# Patient Record
Sex: FEMALE | Race: WHITE | NOT HISPANIC OR LATINO | Employment: UNEMPLOYED | URBAN - METROPOLITAN AREA
[De-identification: names, ages, dates, MRNs, and addresses within clinical notes are randomized per-mention and may not be internally consistent; named-entity substitution may affect disease eponyms.]

---

## 2017-02-22 ENCOUNTER — TELEPHONE (OUTPATIENT)
Dept: NEUROLOGY | Facility: CLINIC | Age: 72
End: 2017-02-22

## 2017-02-22 NOTE — TELEPHONE ENCOUNTER
----- Message from Monica Correa sent at 2/21/2017 10:46 AM CST -----  Patient states that she has a referral and need to see the doctor as soon as possible.  Please call patient at [phone].

## 2017-02-23 ENCOUNTER — TELEPHONE (OUTPATIENT)
Dept: NEUROLOGY | Facility: CLINIC | Age: 72
End: 2017-02-23

## 2017-02-23 NOTE — TELEPHONE ENCOUNTER
----- Message from Manasa Gardner sent at 2/22/2017  2:52 PM CST -----  Contact: pt [phone]  Call placed to pod patient is returning your call back.

## 2017-02-23 NOTE — TELEPHONE ENCOUNTER
Spoke with patient, informed her that Dr. Montesinos is booked up until May but we can schedule her sooner with Kacey Lopez NP. Patient agreed to see renato Erazo scheduled. Directions given.

## 2017-03-14 ENCOUNTER — OFFICE VISIT (OUTPATIENT)
Dept: NEUROLOGY | Facility: CLINIC | Age: 72
End: 2017-03-14
Payer: MEDICARE

## 2017-03-14 VITALS — RESPIRATION RATE: 17 BRPM | BODY MASS INDEX: 27.39 KG/M2 | HEIGHT: 66 IN | TEMPERATURE: 98 F | WEIGHT: 170.44 LBS

## 2017-03-14 DIAGNOSIS — G31.84 MILD COGNITIVE IMPAIRMENT WITH MEMORY LOSS: Primary | ICD-10-CM

## 2017-03-14 PROCEDURE — 99999 PR PBB SHADOW E&M-EST. PATIENT-LVL IV: CPT | Mod: PBBFAC,,, | Performed by: NURSE PRACTITIONER

## 2017-03-14 PROCEDURE — 1160F RVW MEDS BY RX/DR IN RCRD: CPT | Mod: S$GLB,,, | Performed by: NURSE PRACTITIONER

## 2017-03-14 PROCEDURE — 1157F ADVNC CARE PLAN IN RCRD: CPT | Mod: S$GLB,,, | Performed by: NURSE PRACTITIONER

## 2017-03-14 PROCEDURE — 1159F MED LIST DOCD IN RCRD: CPT | Mod: S$GLB,,, | Performed by: NURSE PRACTITIONER

## 2017-03-14 PROCEDURE — 99215 OFFICE O/P EST HI 40 MIN: CPT | Mod: S$GLB,,, | Performed by: NURSE PRACTITIONER

## 2017-03-14 RX ORDER — DONEPEZIL HYDROCHLORIDE 10 MG/1
10 TABLET, FILM COATED ORAL NIGHTLY
Qty: 30 TABLET | Refills: 11 | Status: SHIPPED | OUTPATIENT
Start: 2017-03-14 | End: 2017-12-06

## 2017-03-14 RX ORDER — DONEPEZIL HYDROCHLORIDE 5 MG/1
5 TABLET, FILM COATED ORAL NIGHTLY
Qty: 30 TABLET | Refills: 11 | Status: SHIPPED | OUTPATIENT
Start: 2017-03-14 | End: 2017-05-23 | Stop reason: DRUGHIGH

## 2017-03-14 NOTE — MR AVS SNAPSHOT
[facility]  [address] [facility] [address] [place] [address]  Phone: [phone]  Fax: [phone]                  Milady Bright   3/14/2017 9:00 AM   Office Visit    Description:  Female : 1945   Provider:  Kacey Lopez NP   Department:  [place] - Neurology           Reason for Visit     Memory Loss           Diagnoses this Visit        Comments    Mild cognitive impairment with memory loss    -  Primary            To Do List           Future Appointments        Provider Department Dept Phone    3/28/2017 8:00 AM [facility] MRI1 [facility] [phone]      Goals (5 Years of Data)     None       These Medications        Disp Refills Start End    donepezil (ARICEPT) 5 MG tablet 30 tablet 11 3/14/2017 3/14/2018    Take 1 tablet (5 mg total) by mouth every evening. - Oral    Pharmacy: [facility]/pharmacy #[id number] - [place] - [address] AT [facility] Ph #: [phone]       donepezil (ARICEPT) 10 MG tablet 30 tablet 11 3/14/2017 3/14/2018    Take 1 tablet (10 mg total) by mouth every evening. - Oral    Pharmacy: [facility]/pharmacy #[id number] - [place], [place] - [address] AT [facility] Ph #: [phone]       Notes to Pharmacy: Please hold Donepezil 10 mg until patient calls after she finishes Aricept 5 mg.  Thank you      [facility]     [facility] -  Assistance  Registered nurses in the [facility] provide clinical advisement, health education, appointment booking, and other advisory services.  Call for this free service at [phone].             Medications           Message regarding Medications     Verify the changes and/or additions to your medication regime listed below are the same as discussed with your clinician today.  If any of these changes or additions are incorrect, please notify your healthcare provider.        START taking these NEW medications        Refills    donepezil (ARICEPT) 5 MG tablet 11    Sig: Take  1 tablet (5 mg total) by mouth every evening.    Class: Normal    Route: Oral    donepezil (ARICEPT) 10 MG tablet 11    Sig: Take 1 tablet (10 mg total) by mouth every evening.    Class: Normal    Route: Oral      STOP taking these medications     hydrocodone-acetaminophen 7.5-325mg (NORCO) 7.5-325 mg per tablet Take 1 tablet by mouth 3 (three) times daily as needed.           Verify that the below list of medications is an accurate representation of the medications you are currently taking.  If none reported, the list may be blank. If incorrect, please contact your healthcare provider. Carry this list with you in case of emergency.           Current Medications     amlodipine (NORVASC) 2.5 MG tablet Take 1 tablet (2.5 mg total) by mouth once daily.    aspirin (ECOTRIN) 81 MG EC tablet Take 81 mg by mouth once daily.    blood sugar diagnostic (ACCU-CHEK SMARTVIEW TEST STRIP) Strp 1 strip by Misc.(Non-Drug; Combo Route) route 3 (three) times daily.    CALCIUM CARBONATE/MAG CARB/FA (MAGNESIUM-CALCIUM-FOLIC ACID ORAL) Take by mouth.    calcium-vitamin D3 (CALCIUM 500 + D) 500 mg(1,250mg) -200 unit per tablet Take 1 tablet by mouth 2 (two) times daily with meals.    diphenhydrAMINE (BENADRYL) 50 MG tablet Take 50 mg by mouth nightly as needed for Itching.    estradiol (ESTRACE) 0.01 % (0.1 mg/gram) vaginal cream Place 1 g vaginally every 7 days.    hydrochlorothiazide (HYDRODIURIL) 25 MG tablet TAKE 1 TABLET IN THE MORNING FOR BLOOD PRESSURE    irbesartan (AVAPRO) 300 MG tablet Take 1 tablet (300 mg total) by mouth once daily.    lancing device with lancets Kit 1 lancet by Misc.(Non-Drug; Combo Route) route 3 (three) times daily.    potassium chloride (MICRO-K) 10 MEQ CpSR Take 1 capsule (10 mEq total) by mouth once daily.    sitagliptin (JANUVIA) 100 MG Tab Take 1 tablet (100 mg total) by mouth once daily.    donepezil (ARICEPT) 10 MG tablet Take 1 tablet (10 mg total) by mouth every evening.    donepezil (ARICEPT) 5  "MG tablet Take 1 tablet (5 mg total) by mouth every evening.    exenatide microspheres 2 mg/0.65 mL PnIj Inject 1 Syringe into the skin every 7 days.    glimepiride (AMARYL) 4 MG tablet TAKE 1 TABLET DAILY WITH BREAKFAST.           Clinical Reference Information           Your Vitals Were     Temp Resp Height Weight BMI    98.3 °F (36.8 °C) (Oral) 17 5' 6" (1.676 m) 77.3 kg (170 lb 6.7 oz) 27.51 kg/m2      Allergies as of 3/14/2017     Invokana [Canagliflozin]    Metformin    Statins-hmg-coa Reductase Inhibitors      Immunizations Administered on Date of Encounter - 3/14/2017     None      Orders Placed During Today's Visit     Future Labs/Procedures Expected by Expires    MRI Brain W WO Contrast  3/14/2017 3/14/2018      Instructions    Donepezil    1.  Take Donepezil 5 mg (1/2 tablet) in the morning with food for 2 weeks.  If no adverse side effects (nausea, diarrhea, vivid dreams) go to #2    2.  Take Donepezil 5 mg (1 tablet) in the morning with food until you are finished the prescription.  If no side effects, go to #3    3.  Take Donepezil 10 mg (1 tablet) in the morning with food.    You will have to call the pharmacy to get prescription for Donepezil 10 mg.       Language Assistance Services     ATTENTION: Language assistance services are available, free of charge. Please call [phone].      ATENCIÓN: Si habla español, tiene a rubi disposición servicios gratuitos de asistencia lingüística. Llame al [phone].     [facility] Ý: N?u b?n nói Ti?ng Vi?t, có các d?ch v? h? tr? ngôn ng? mi?n phí dành cho b?n. G?i s? [phone].         [facility] complies with applicable Federal civil rights laws and does not discriminate on the basis of race, color, national origin, age, disability, or sex.        "

## 2017-03-14 NOTE — LETTER
March 18, 2017      TOMASA Ellis Jr., MD  [address]  [facility] [address]           [facility]  [address] [facility] [address] [place] [address]  Phone: [phone]  Fax: [phone]          Patient: Milady Bright   MR Number: [id number]   YOB: 1945   Date of Visit: 3/14/2017       Dear Dr. TOMASA Ellis Jr.:    Thank you for referring Milady Bright to me for evaluation. Attached you will find relevant portions of my assessment and plan of care.    If you have questions, please do not hesitate to call me. I look forward to following Milady Bright along with you.    Sincerely,    Kacey Lopez, ANKIT    Enclosure  CC:  No Recipients    If you would like to receive this communication electronically, please contact [email or web address] or [phone] to request more information on [employer] Link access.    For providers and/or their staff who would like to refer a patient to [facility], please contact us through our one-stop-shop provider referral line, [facility], at [phone].    If you feel you have received this communication in error or would no longer like to receive these types of communications, please e-mail [email or web address]

## 2017-03-14 NOTE — PROGRESS NOTES
Subjective:       Patient ID: Milady Bright is a 72 y.o. female.    Chief Complaint:  Memory Loss    History of Present Illness  HPI  Memory Loss  She is here for evaluation and treatment of cognitive problems. She is accompanied by daughter. Primary caregiver is patient. Patient was evaluated for memory loss by Dr Dorman Patient and daughter feel her memory has become worse since her visit with Dr Dorman. The family and the patient identify problems with changes in short term memory, recalling words and repetition of questions. Family and patient report no behavioral problems but more anxious. No hallucinations. No recent falls. Family and patient are concerned about  medication errors and driving, however, they are not concerned about wandering, cooking and inability to maintain adequate nutrition. Medication administration: patient self medicates  Continues to work as a  twice a week.  She does not feel her job is affected by her cognitive function.  HbA1c remains above target goal range but she is working on diet compliance.  Followed by Dr Ellis for DM management.  Functional Assessment:   Activities of Daily Living (ADLs):    She is independent in the following: ambulation, bathing and hygiene, feeding, continence, grooming, toileting and dressing  Requires assistance with the following: none  Instrumental Activities of Daily Living (IADLs):    She is independent in the following: manages all of her affairs  Requires assistance with the following: none    Review of Systems  Review of Systems   Constitutional: Negative for activity change and appetite change.   HENT: Negative for hearing loss.    Eyes: Negative for visual disturbance.   Respiratory: Positive for cough. Negative for wheezing.    Cardiovascular: Negative for chest pain and palpitations.   Gastrointestinal: Negative for constipation.   Genitourinary: Negative for urgency.   Musculoskeletal: Positive for back pain.   Neurological: Negative  for tremors.        Memory loss   Psychiatric/Behavioral: Negative for confusion and decreased concentration.       Objective:      Neurologic Exam     Mental Status   Oriented to person, place, and time.   Oriented to city.   Oriented to year, month, date and day.   Registration of memory: recalls 4 of 5 words. Recall of objects at 5 minutes: recalls 5 of 5 words. Follows 3 step commands.   Attention: normal. Concentration: normal.   Knowledge: good and consistent with education. Unable to perform simple calculations (2 of 5 calculations).   Able to name object. Able to read. Able to repeat. Normal comprehension.          MOCA was administered- Overall score was 25/30  Normal >26  (see scanned test)  VS/Executive function 3/5;  Verbal Fluency-9 words      Cranial Nerves   Cranial nerves II through XII intact.     Motor Exam     Strength   Strength 5/5 except as noted.     Sensory Exam   Light touch normal.   Proprioception normal.     Gait, Coordination, and Reflexes     Gait  Gait: non-neurologic    Coordination   Romberg: negative  Finger to nose coordination: normal  Tandem walking coordination: abnormal    Tremor   Resting tremor: absent  Intention tremor: absent      Physical Exam   Constitutional: She is oriented to person, place, and time.   Neurological: She is oriented to person, place, and time. She has an abnormal Tandem Gait Test. She has a normal Finger-Nose-Finger Test and a normal Romberg Test.       Diagnostics  No imaging to review    Labs 2/14/17  TSH 1.580 HbA1c 10.2  Assessment:     Mild Cognitive Impairment with Memory Loss    Plan:     -Reviewed results of cognitive screen and answered questions.  - Discussed MCI vs early dementia. Mild cognitive impairment is defined as the transitional state between the cognitive changes of normal aging and the fully developed features of dementia. There are several types of MCI. Some subtypes may have no or little disease progression and some other subtypes  may show a progressive decline in memory and cognition that results in the diagnosis of degenerative type of dementia. The clinical outcome of individuals with MCI remains unclear and unpredictable.   -Diagnostic work up- MRI  -Discussed safety issues related to MCI- medication management, cooking, managing finances  -Discussed medications for cognitive enhancement- AChEIs and Namenda CR  Patient feels his memory issues are worsening.  Discussed medications at length.  Discussed benefits/effects/side effects.    Will try a titration trial with Donepezil  -Discussed NP testing with Neuropsychology   Will discuss next visit.  -Will continue to follow  -Follow up in 3  months

## 2017-03-14 NOTE — PATIENT INSTRUCTIONS
Donepezil    1.  Take Donepezil 5 mg (1/2 tablet) in the morning with food for 2 weeks.  If no adverse side effects (nausea, diarrhea, vivid dreams) go to #2    2.  Take Donepezil 5 mg (1 tablet) in the morning with food until you are finished the prescription.  If no side effects, go to #3    3.  Take Donepezil 10 mg (1 tablet) in the morning with food.    You will have to call the pharmacy to get prescription for Donepezil 10 mg.

## 2017-03-28 ENCOUNTER — HOSPITAL ENCOUNTER (OUTPATIENT)
Dept: RADIOLOGY | Facility: HOSPITAL | Age: 72
Discharge: HOME | End: 2017-03-28
Attending: NURSE PRACTITIONER
Payer: MEDICARE

## 2017-03-28 DIAGNOSIS — G31.84 MILD COGNITIVE IMPAIRMENT WITH MEMORY LOSS: ICD-10-CM

## 2017-03-28 PROCEDURE — 25500020 PHARM REV CODE 255: Mod: PO | Performed by: NURSE PRACTITIONER

## 2017-03-28 PROCEDURE — 70553 MRI BRAIN STEM W/O & W/DYE: CPT | Mod: TC,PO

## 2017-03-28 PROCEDURE — A9585 GADOBUTROL INJECTION: HCPCS | Mod: PO | Performed by: NURSE PRACTITIONER

## 2017-03-28 PROCEDURE — 70553 MRI BRAIN STEM W/O & W/DYE: CPT | Mod: 26,,, | Performed by: RADIOLOGY

## 2017-03-28 RX ORDER — GADOBUTROL 604.72 MG/ML
7 INJECTION INTRAVENOUS
Status: COMPLETED | OUTPATIENT
Start: 2017-03-28 | End: 2017-03-28

## 2017-03-28 RX ADMIN — GADOBUTROL 7 ML: 604.72 INJECTION INTRAVENOUS at 08:03

## 2017-03-29 ENCOUNTER — TELEPHONE (OUTPATIENT)
Dept: NEUROLOGY | Facility: CLINIC | Age: 72
End: 2017-03-29

## 2017-03-29 ENCOUNTER — PATIENT MESSAGE (OUTPATIENT)
Dept: NEUROLOGY | Facility: CLINIC | Age: 72
End: 2017-03-29

## 2017-03-29 NOTE — TELEPHONE ENCOUNTER
----- Message from Luci Riley RT sent at 3/29/2017  1:45 PM CDT -----  Contact: pt    pt  requesting her MRI results, thanks.

## 2017-03-29 NOTE — TELEPHONE ENCOUNTER
Spoke with patient. Informed her that Kacey is out of the office until Monday and that I would have her results then. Pt verbalized an understanding.

## 2017-03-31 NOTE — TELEPHONE ENCOUNTER
Your MRI showed some vascular disease normal for your age and diabetes. I will review the films with you at next visit.  No change in treatment plan at this time.

## 2017-04-03 ENCOUNTER — TELEPHONE (OUTPATIENT)
Dept: NEUROLOGY | Facility: CLINIC | Age: 72
End: 2017-04-03

## 2017-04-03 NOTE — TELEPHONE ENCOUNTER
----- Message from Maria Elena Hines sent at 4/3/2017  2:57 PM CDT -----  Patient is calling for MRi test results. Please call patient back at [phone].                . Thank you.

## 2017-05-02 ENCOUNTER — OFFICE VISIT (OUTPATIENT)
Dept: NEUROLOGY | Facility: CLINIC | Age: 72
End: 2017-05-02
Payer: MEDICARE

## 2017-05-02 VITALS
BODY MASS INDEX: 25.64 KG/M2 | HEART RATE: 72 BPM | SYSTOLIC BLOOD PRESSURE: 136 MMHG | TEMPERATURE: 98 F | HEIGHT: 67 IN | DIASTOLIC BLOOD PRESSURE: 80 MMHG | RESPIRATION RATE: 18 BRPM | WEIGHT: 163.38 LBS

## 2017-05-02 DIAGNOSIS — E11.40 NEUROPATHY DUE TO TYPE 2 DIABETES MELLITUS: ICD-10-CM

## 2017-05-02 DIAGNOSIS — I67.2 ATHEROSCLEROTIC CEREBROVASCULAR DISEASE: Primary | ICD-10-CM

## 2017-05-02 DIAGNOSIS — M89.9 DISORDER OF BONE AND CARTILAGE, UNSPECIFIED: ICD-10-CM

## 2017-05-02 DIAGNOSIS — R41.3 MEMORY LOSS: ICD-10-CM

## 2017-05-02 DIAGNOSIS — M94.9 DISORDER OF BONE AND CARTILAGE, UNSPECIFIED: ICD-10-CM

## 2017-05-02 DIAGNOSIS — G47.00 INSOMNIA, UNSPECIFIED TYPE: ICD-10-CM

## 2017-05-02 PROCEDURE — 1160F RVW MEDS BY RX/DR IN RCRD: CPT | Mod: S$GLB,,, | Performed by: PSYCHIATRY & NEUROLOGY

## 2017-05-02 PROCEDURE — 3060F POS MICROALBUMINURIA REV: CPT | Mod: 8P,S$GLB,, | Performed by: PSYCHIATRY & NEUROLOGY

## 2017-05-02 PROCEDURE — 1126F AMNT PAIN NOTED NONE PRSNT: CPT | Mod: S$GLB,,, | Performed by: PSYCHIATRY & NEUROLOGY

## 2017-05-02 PROCEDURE — 3075F SYST BP GE 130 - 139MM HG: CPT | Mod: S$GLB,,, | Performed by: PSYCHIATRY & NEUROLOGY

## 2017-05-02 PROCEDURE — 3046F HEMOGLOBIN A1C LEVEL >9.0%: CPT | Mod: S$GLB,,, | Performed by: PSYCHIATRY & NEUROLOGY

## 2017-05-02 PROCEDURE — 3079F DIAST BP 80-89 MM HG: CPT | Mod: S$GLB,,, | Performed by: PSYCHIATRY & NEUROLOGY

## 2017-05-02 PROCEDURE — 99999 PR PBB SHADOW E&M-EST. PATIENT-LVL III: CPT | Mod: PBBFAC,,, | Performed by: PSYCHIATRY & NEUROLOGY

## 2017-05-02 PROCEDURE — 99214 OFFICE O/P EST MOD 30 MIN: CPT | Mod: S$GLB,,, | Performed by: PSYCHIATRY & NEUROLOGY

## 2017-05-02 PROCEDURE — 1159F MED LIST DOCD IN RCRD: CPT | Mod: S$GLB,,, | Performed by: PSYCHIATRY & NEUROLOGY

## 2017-05-02 NOTE — PROGRESS NOTES
Subjective:         Patient ID: Milady Bright is a 72 y.o.  female who presents for follow up of memory loss    History of Present Illness    Recently seen by Kacey Lopez, 3/18/17.  From her visit:  Memory Loss  She is here for evaluation and treatment of cognitive problems. She is accompanied by daughter. Primary caregiver is patient. Patient was evaluated for memory loss by Dr Dorman Patient and daughter feel her memory has become worse since her visit with Dr Dorman. The family and the patient identify problems with changes in short term memory, recalling words and repetition of questions. Family and patient report no behavioral problems but more anxious. No hallucinations. No recent falls. Family and patient are concerned about medication errors and driving, however, they are not concerned about wandering, cooking and inability to maintain adequate nutrition. Medication administration: patient self medicates Continues to work as a  twice a week. She does not feel her job is affected by her cognitive function. HbA1c remains above target goal range but she is working on diet compliance. Followed by Dr Ellis for DM management.  Functional Assessment:   Activities of Daily Living (ADLs):    She is independent in the following: ambulation, bathing and hygiene, feeding, continence, grooming, toileting and dressing  Requires assistance with the following: none  Instrumental Activities of Daily Living (IADLs):   She is independent in the following: manages all of her affairs  Requires assistance with the following: none    We reviewed her MRI together - discussed lifestyle changes    Uses notes - keeps them and looks back at them    Review of patient's allergies indicates:   Allergen Reactions    Invokana [canagliflozin] Other (See Comments)     Diarrhea, yeast infections    Metformin Diarrhea    Statins-hmg-coa reductase inhibitors      Leg cramps     Current Outpatient Prescriptions   Medication Sig  Dispense Refill    amlodipine (NORVASC) 2.5 MG tablet Take 1 tablet (2.5 mg total) by mouth once daily. 90 tablet 0    aspirin (ECOTRIN) 81 MG EC tablet Take 81 mg by mouth once daily.      blood sugar diagnostic (ACCU-CHEK SMARTVIEW TEST STRIP) Strp 1 strip by Misc.(Non-Drug; Combo Route) route 3 (three) times daily. 100 strip 11    CALCIUM CARBONATE/MAG CARB/FA (MAGNESIUM-CALCIUM-FOLIC ACID ORAL) Take by mouth.      calcium-vitamin D3 (CALCIUM 500 + D) 500 mg(1,250mg) -200 unit per tablet Take 1 tablet by mouth 2 (two) times daily with meals.      diphenhydrAMINE (BENADRYL) 50 MG tablet Take 50 mg by mouth nightly as needed for Itching.      donepezil (ARICEPT) 10 MG tablet Take 1 tablet (10 mg total) by mouth every evening. 30 tablet 11    donepezil (ARICEPT) 5 MG tablet Take 1 tablet (5 mg total) by mouth every evening. 30 tablet 11    dulaglutide (TRULICITY) 1.5 mg/0.5 mL PnIj Inject 1.5 mg into the skin every 7 days. 4 Syringe 11    estradiol (ESTRACE) 0.01 % (0.1 mg/gram) vaginal cream Place 1 g vaginally every 7 days.      hydrochlorothiazide (HYDRODIURIL) 25 MG tablet TAKE 1 TABLET IN THE MORNING FOR BLOOD PRESSURE 90 tablet 3    irbesartan (AVAPRO) 300 MG tablet Take 1 tablet (300 mg total) by mouth once daily. 90 tablet 0    lancing device with lancets Kit 1 lancet by Misc.(Non-Drug; Combo Route) route 3 (three) times daily. 100 each 11    potassium chloride (MICRO-K) 10 MEQ CpSR Take 1 capsule (10 mEq total) by mouth once daily. 90 capsule 0     No current facility-administered medications for this visit.          Review of Systems  Review of Systems    Objective:        Vitals:    05/02/17 0836   BP: 136/80   Pulse: 72   Resp: 18   Temp: 98 °F (36.7 °C)     Body mass index is 25.59 kg/(m^2).  Neurologic Exam     Mental Status   Oriented to person, place, and time.   Registration: recalls 3 of 3 objects. Recall at 5 minutes: recalls 3 of 3 objects. Follows 3 step commands.   Attention:  normal. Concentration: normal.   Level of consciousness: alert  Knowledge: good.   Able to name object. Able to repeat. Normal comprehension.        Difficulty with calculations; WORLD-DLROW  Did well with pentagons, great time and organization on [employer] drawing.  12-6-3-9 placed first, hands placed accurately with arrows     Cranial Nerves   Cranial nerves II through XII intact.     Motor Exam   Right arm pronator drift: absent  Left arm pronator drift: absent    Strength   Strength 5/5 throughout.     Gait, Coordination, and Reflexes     Gait  Gait: normal    Coordination   Romberg: negative    Tremor   Resting tremor: absent  Action tremor: absent      Physical Exam   Constitutional: She is oriented to person, place, and time.   Neurological: She is oriented to person, place, and time. She has normal strength. She has a normal Romberg Test. Gait normal.         Data Review:  MRI brain 3/14/17:  Narrative   Procedure:  Multiplanar MR imaging of the brain was performed both before and following IV injection of 7 mL Gadavist.    Findings:  The images demonstrate multiple small T2 hyperintense foci in the bilateral cerebral white matter consistent with age-related microvascular ischemic change.  The diffusion scan reveals no evidence of a recent ischemic event.  No abnormal enhancement is present.  There are no findings to suggest hemorrhage, large vessel infarction, or neoplasm.  The ventricular system is normal.  The temporal bone structures and orbits demonstrate no abnormalities.  A 1.2 cm equals a retention cyst is present within the lateral wall of the right maxillary sinus.   Impression     1.  Age-related microvascular ischemic changes of the bilateral cerebral white matter.  2.  Small right maxillary sinus retention cyst.      Electronically signed by: Zeke Paulino  Date: 03/28/17  Time: 09:20        Assessment:        1. Atherosclerotic cerebrovascular disease    2. Memory loss    3. Neuropathy due to type  2 diabetes mellitus    4. Insomnia, unspecified type    5. Disorder of bone and cartilage, unspecified       Gave reassurance, I think her memory impairments are quite mild at worst; recommended we obtain formal neuropsychological testing, she really did not want to at this time.       Plan:         Problem List Items Addressed This Visit        Neuro    Memory loss    Relevant Orders    Vitamin B12    VITAMIN D       Musculoskeletal and Integument    Disorder of bone and cartilage, unspecified    Relevant Orders    VITAMIN D      Other Visit Diagnoses     Atherosclerotic cerebrovascular disease    -  Primary    Relevant Orders    Lipid panel    Neuropathy due to type 2 diabetes mellitus        Insomnia, unspecified type        Relevant Orders    VITAMIN D          Return in about 3 months (around 8/2/2017).       Thank you very much for the opportunity to assist in this patient's care.  If you have any questions or concerns, please do not hesitate to contact me at any time.     Sincerely,  Clarke Montesinos, DO

## 2017-05-02 NOTE — MR AVS SNAPSHOT
[facility]  [address] [facility] [address] [place] [address]  Phone: [phone]  Fax: [phone]                  Milady Bright   2017 9:00 AM   Office Visit    Description:  Female : 1945   Provider:  Clarke Montesinos DO   Department:  [facility]           Diagnoses this Visit        Comments    Atherosclerotic cerebrovascular disease    -  Primary     Memory loss         Neuropathy due to type 2 diabetes mellitus         Insomnia, unspecified type         Disorder of bone and cartilage, unspecified                To Do List           Goals (5 Years of Data)     None      Follow-Up and Disposition     Return in about 3 months (around 2017).      [facility]     [facility] Care [facility] -  Assistance  Unless otherwise directed by your provider, please contact [facility], our nurse care line that is available for  assistance.     Registered nurses in the [facility] provide: appointment scheduling, clinical advisement, health education, and other advisory services.  Call: [phone] (toll free)               Medications           Message regarding Medications     Verify the changes and/or additions to your medication regime listed below are the same as discussed with your clinician today.  If any of these changes or additions are incorrect, please notify your healthcare provider.             Verify that the below list of medications is an accurate representation of the medications you are currently taking.  If none reported, the list may be blank. If incorrect, please contact your healthcare provider. Carry this list with you in case of emergency.           Current Medications     amlodipine (NORVASC) 2.5 MG tablet Take 1 tablet (2.5 mg total) by mouth once daily.    aspirin (ECOTRIN) 81 MG EC tablet Take 81 mg by mouth once daily.    blood sugar diagnostic (ACCU-CHEK SMARTVIEW TEST STRIP) Strp 1 strip by Misc.(Non-Drug; Combo Route)  "route 3 (three) times daily.    CALCIUM CARBONATE/MAG CARB/FA (MAGNESIUM-CALCIUM-FOLIC ACID ORAL) Take by mouth.    calcium-vitamin D3 (CALCIUM 500 + D) 500 mg(1,250mg) -200 unit per tablet Take 1 tablet by mouth 2 (two) times daily with meals.    diphenhydrAMINE (BENADRYL) 50 MG tablet Take 50 mg by mouth nightly as needed for Itching.    donepezil (ARICEPT) 10 MG tablet Take 1 tablet (10 mg total) by mouth every evening.    donepezil (ARICEPT) 5 MG tablet Take 1 tablet (5 mg total) by mouth every evening.    dulaglutide (TRULICITY) 1.5 mg/0.5 mL PnIj Inject 1.5 mg into the skin every 7 days.    estradiol (ESTRACE) 0.01 % (0.1 mg/gram) vaginal cream Place 1 g vaginally every 7 days.    hydrochlorothiazide (HYDRODIURIL) 25 MG tablet TAKE 1 TABLET IN THE MORNING FOR BLOOD PRESSURE    irbesartan (AVAPRO) 300 MG tablet Take 1 tablet (300 mg total) by mouth once daily.    lancing device with lancets Kit 1 lancet by Misc.(Non-Drug; Combo Route) route 3 (three) times daily.    potassium chloride (MICRO-K) 10 MEQ CpSR Take 1 capsule (10 mEq total) by mouth once daily.           Clinical Reference Information           Your Vitals Were     BP Pulse Temp Resp Height Weight    136/80 (BP Location: Left arm, Patient Position: Sitting, BP Method: Automatic) 72 98 °F (36.7 °C) (Oral) 18 5' 7" (1.702 m) 74.1 kg (163 lb 5.8 oz)    BMI                25.59 kg/m2          Blood Pressure          Most Recent Value    BP  136/80      Allergies as of 5/2/2017     Invokana [Canagliflozin]    Metformin    Statins-hmg-coa Reductase Inhibitors      Immunizations Administered on Date of Encounter - 5/2/2017     None      Orders Placed During Today's Visit     Future Labs/Procedures Expected by Expires    Lipid panel  5/2/2017 7/1/2018    Vitamin B12  5/2/2017 7/1/2018    VITAMIN D  5/2/2017 7/1/2018      Language Assistance Services     ATTENTION: Language assistance services are available, free of charge. Please call [phone].  "     ATENCIÓN: Si habla español, tiene a rubi disposición servicios gratuitos de asistencia lingüística. Lionel al [phone].     CHÚ Ý: N?u b?n nói Ti?ng Vi?t, có các d?ch v? h? tr? ngôn ng? mi?n phí dành cho b?n. G?i s? [phone].         [facility] complies with applicable Federal civil rights laws and does not discriminate on the basis of race, color, national origin, age, disability, or sex.

## 2017-05-02 NOTE — LETTER
May 2, 2017      TOMASA Ellis Jr., MD  [address]  [facility] [address]           [facility]  [address] [place] [address]  Phone: [phone]  Fax: [phone]          Patient: Milady Bright   MR Number: [id number]   YOB: 1945   Date of Visit: 5/2/2017       Dear Dr. TOMASA Ellis Jr.:    Thank you for referring Milady Bright to me for evaluation. Attached you will find relevant portions of my assessment and plan of care.    If you have questions, please do not hesitate to call me. I look forward to following Milady Bright along with you.    Sincerely,    Clarke Montesinos, DO    Enclosure  CC:  No Recipients    If you would like to receive this communication electronically, please contact [email or web address] or [phone] to request more information on [employer] [facility] Link access.    For providers and/or their staff who would like to refer a patient to [facility], please contact us through our one-stop-shop provider referral line, [facility], at [phone].    If you feel you have received this communication in error or would no longer like to receive these types of communications, please e-mail [email or web address]

## 2017-05-16 ENCOUNTER — PATIENT MESSAGE (OUTPATIENT)
Dept: NEUROLOGY | Facility: CLINIC | Age: 72
End: 2017-05-16

## 2017-06-02 ENCOUNTER — PATIENT MESSAGE (OUTPATIENT)
Dept: NEUROLOGY | Facility: CLINIC | Age: 72
End: 2017-06-02

## 2017-08-01 ENCOUNTER — OFFICE VISIT (OUTPATIENT)
Dept: NEUROLOGY | Facility: CLINIC | Age: 72
End: 2017-08-01
Payer: MEDICARE

## 2017-08-01 VITALS
WEIGHT: 168.63 LBS | SYSTOLIC BLOOD PRESSURE: 152 MMHG | BODY MASS INDEX: 26.47 KG/M2 | RESPIRATION RATE: 18 BRPM | HEIGHT: 67 IN | HEART RATE: 69 BPM | DIASTOLIC BLOOD PRESSURE: 84 MMHG

## 2017-08-01 DIAGNOSIS — I67.2 ATHEROSCLEROTIC CEREBROVASCULAR DISEASE: Primary | ICD-10-CM

## 2017-08-01 DIAGNOSIS — R41.3 MEMORY LOSS: ICD-10-CM

## 2017-08-01 PROCEDURE — 99999 PR PBB SHADOW E&M-EST. PATIENT-LVL III: CPT | Mod: PBBFAC,,, | Performed by: PSYCHIATRY & NEUROLOGY

## 2017-08-01 PROCEDURE — 99213 OFFICE O/P EST LOW 20 MIN: CPT | Mod: S$GLB,,, | Performed by: PSYCHIATRY & NEUROLOGY

## 2017-08-01 PROCEDURE — 1126F AMNT PAIN NOTED NONE PRSNT: CPT | Mod: S$GLB,,, | Performed by: PSYCHIATRY & NEUROLOGY

## 2017-08-01 PROCEDURE — 1159F MED LIST DOCD IN RCRD: CPT | Mod: S$GLB,,, | Performed by: PSYCHIATRY & NEUROLOGY

## 2017-08-01 NOTE — PROGRESS NOTES
Subjective:         Patient ID: Milady Bright is a 72 y.o.  female who presents for follow up of atherosclerotic cerebrovascular disease and subjective memory loss    Initial History of Present Illness:    Interval history since last visit:  She did start Aricept, initially had some Gi upset but feels this has passed. Overall feels like her memory has been stable, feels like the Aricept has made an improvement.  Overall she is doing well.       Review of patient's allergies indicates:   Allergen Reactions    Invokana [canagliflozin] Other (See Comments)     Diarrhea, yeast infections    Metformin Diarrhea    Statins-hmg-coa reductase inhibitors      Leg cramps     Current Outpatient Prescriptions   Medication Sig Dispense Refill    amlodipine (NORVASC) 2.5 MG tablet TAKE ONE TABLET DAILY 90 tablet 3    aspirin (ECOTRIN) 81 MG EC tablet Take 81 mg by mouth once daily.      blood sugar diagnostic (ACCU-CHEK SMARTVIEW TEST STRIP) Strp 1 strip by Misc.(Non-Drug; Combo Route) route 3 (three) times daily. 100 strip 11    CALCIUM CARBONATE/MAG CARB/FA (MAGNESIUM-CALCIUM-FOLIC ACID ORAL) Take by mouth.      calcium-vitamin D3 (CALCIUM 500 + D) 500 mg(1,250mg) -200 unit per tablet Take 1 tablet by mouth 2 (two) times daily with meals.      diphenhydrAMINE (BENADRYL) 50 MG tablet Take 50 mg by mouth nightly as needed for Itching.      donepezil (ARICEPT) 10 MG tablet Take 1 tablet (10 mg total) by mouth every evening. 30 tablet 11    dulaglutide (TRULICITY) 1.5 mg/0.5 mL PnIj Inject 1.5 mg into the skin every 7 days. 4 Syringe 11    estradiol (ESTRACE) 0.01 % (0.1 mg/gram) vaginal cream Place 1 g vaginally every 7 days.      hydrochlorothiazide (HYDRODIURIL) 25 MG tablet TAKE 1 TABLET IN THE MORNING FOR BLOOD PRESSURE 90 tablet 3    insulin glargine, TOUJEO, (TOUJEO SOLOSTAR) 300 unit/mL (1.5 mL) InPn pen Inject 15 Units into the skin once daily. 4.5 mL 11    irbesartan (AVAPRO) 300 MG tablet TAKE 1 TABLET (300  "MG TOTAL) BY MOUTH DAILY. 90 tablet 3    lancing device with lancets Kit 1 lancet by Misc.(Non-Drug; Combo Route) route 3 (three) times daily. 100 each 11    pen needle, diabetic 32 gauge x 5/32" Ndle 1 each by Misc.(Non-Drug; Combo Route) route Daily. 90 each 3    potassium chloride (MICRO-K) 10 MEQ CpSR TAKE 1 CAPSULE ONE TIME DAILY 90 capsule 0     No current facility-administered medications for this visit.          Review of Systems  Review of Systems    Objective:        Vitals:    08/01/17 0859   BP: (!) 152/84   Pulse: 69   Resp: 18     Body mass index is 26.41 kg/m².  Neurologic Exam     Mental Status   Registration: recalls 3 of 3 objects. Recall at 5 minutes: recalls 3 of 3 objects.   Attention: normal. Concentration: normal.   Speech: speech is normal   Level of consciousness: alert  Knowledge: good.   Able to name object. Able to repeat. Normal comprehension.   Complex objects 3/3, recall 3/3.  Rechallenge 10 minutes later also 3/3    WORLD-DLROW    [id number]    Became very apprehensive on verbal fluency - on FAS: A=2, F=3, S=7  Categorical naming (fruits and vegetables) = 14       Physical Exam   Constitutional: She appears well-developed and well-nourished.   HENT:   Head: Normocephalic and atraumatic.   Cardiovascular: Normal rate and regular rhythm.    Psychiatric: She has a normal mood and affect. Her speech is normal and behavior is normal. Judgment and thought content normal.   She was visibly a little bit stressed during verbal fluency testing; after letting her relax and giving some reassurance improved performance   Vitals reviewed.        Data Review:  Results for MARIA A, AMEBR R (MRN [id number]) as of 8/1/2017 09:09   Ref. Range 5/16/2017 08:42   Vitamin B-12 Latest Ref Range: 210 - 950 pg/mL 573   Sodium Latest Ref Range: 136 - 145 mmol/L 142   Potassium Latest Ref Range: 3.5 - 5.1 mmol/L 4.1   Chloride Latest Ref Range: 95 - 110 mmol/L 99   CO2 Latest Ref Range: 22 - 31 mmol/L 30   Anion " Gap Latest Ref Range: 8 - 16 mmol/L 13   BUN, Bld Latest Ref Range: 7 - 18 mg/dL 18   Creatinine Latest Ref Range: 0.50 - 1.40 mg/dL 0.68   eGFR if non African American Latest Ref Range: >60 mL/min/1.73 m^2 >60   eGFR if  Latest Ref Range: >60 mL/min/1.73 m^2 >60   Glucose Latest Ref Range: 70 - 110 mg/dL 142 (H)   Calcium Latest Ref Range: 8.4 - 10.2 mg/dL 9.9   Triglycerides Latest Ref Range: 30 - 150 mg/dL 99   Cholesterol Latest Ref Range: 120 - 199 mg/dL 184   HDL Latest Ref Range: 40 - 75 mg/dL 46   LDL Cholesterol Latest Ref Range: 63.0 - 159.0 mg/dL 118.2   Total Cholesterol/HDL Ratio Latest Ref Range: 2.0 - 5.0  4.0   Vit D, 25-Hydroxy Latest Ref Range: 30 - 96 ng/mL 49   Hemoglobin A1C Latest Ref Range: 0.0 - 5.6 % 7.4 (H)   Estimated Avg Glucose Latest Ref Range: 68 - 131 mg/dL 166 (H)     Assessment:        1. Atherosclerotic cerebrovascular disease    2. Memory loss            Plan:         Problem List Items Addressed This Visit        Neuro    Memory loss    Current Assessment & Plan     Primarily subjective, and I think to great extent related to worry and distraction.  I don't see any sign of dementia.  She feels the Aricept is beneficial therefore we'll continue it.  Continue with control of blood pressure, regular exercise (brisk walk 30 minutes 5 days a week), healthy diet.  Encouraged patient I don't see any significant memory impairment, maintain mindfulness of her activity and avoiding distraction should help in the future.         Atherosclerotic cerebrovascular disease - Primary      Other Visit Diagnoses    None.         Return in about 6 months (around 2/1/2018).       Thank you very much for the opportunity to assist in this patient's care.  If you have any questions or concerns, please do not hesitate to contact me at any time.     Sincerely,  Clarke Montesinos, DO

## 2017-08-01 NOTE — ASSESSMENT & PLAN NOTE
Primarily subjective, and I think to great extent related to worry and distraction.  I don't see any sign of dementia.  She feels the Aricept is beneficial therefore we'll continue it.  Continue with control of blood pressure, regular exercise (brisk walk 30 minutes 5 days a week), healthy diet.  Encouraged patient I don't see any significant memory impairment, maintain mindfulness of her activity and avoiding distraction should help in the future.

## 2017-12-06 ENCOUNTER — TELEPHONE (OUTPATIENT)
Dept: NEUROLOGY | Facility: CLINIC | Age: 72
End: 2017-12-06

## 2017-12-06 NOTE — TELEPHONE ENCOUNTER
----- Message from Felicity Brunson sent at 12/5/2017  2:01 PM CST -----  Contact: Patient   Patient is calling to make a 6 month follow-up appointment.  Please call patient to schedule.  Call Back#[phone]  Thanks

## 2018-01-12 ENCOUNTER — OFFICE VISIT (OUTPATIENT)
Dept: ENDOCRINOLOGY | Facility: CLINIC | Age: 73
End: 2018-01-12
Payer: MEDICARE

## 2018-01-12 ENCOUNTER — CLINICAL SUPPORT (OUTPATIENT)
Dept: FAMILY MEDICINE | Facility: CLINIC | Age: 73
End: 2018-01-12
Attending: INTERNAL MEDICINE
Payer: MEDICARE

## 2018-01-12 VITALS
DIASTOLIC BLOOD PRESSURE: 90 MMHG | HEART RATE: 92 BPM | HEIGHT: 67 IN | BODY MASS INDEX: 26.78 KG/M2 | WEIGHT: 170.63 LBS | SYSTOLIC BLOOD PRESSURE: 140 MMHG

## 2018-01-12 DIAGNOSIS — I10 BENIGN ESSENTIAL HTN: ICD-10-CM

## 2018-01-12 DIAGNOSIS — E11.42 DIABETIC POLYNEUROPATHY ASSOCIATED WITH TYPE 2 DIABETES MELLITUS: ICD-10-CM

## 2018-01-12 PROCEDURE — 99999 PR PBB SHADOW E&M-EST. PATIENT-LVL II: CPT | Mod: PBBFAC,,,

## 2018-01-12 PROCEDURE — 99204 OFFICE O/P NEW MOD 45 MIN: CPT | Mod: S$GLB,,, | Performed by: INTERNAL MEDICINE

## 2018-01-12 PROCEDURE — 99999 PR PBB SHADOW E&M-EST. PATIENT-LVL IV: CPT | Mod: PBBFAC,,, | Performed by: INTERNAL MEDICINE

## 2018-01-12 NOTE — PROGRESS NOTES
Milady Bright is a 72 y.o. female here for a diabetic eye screening with non-dilated fundus photos per Dr Dai.    Patient cooperative?: Yes  Small pupils?: Yes  Last eye exam: 9/15/16    For exam results, see Encounter Report.

## 2018-01-12 NOTE — PROGRESS NOTES
CHIEF COMPLAINT: DM 2  72 year old being seen as a new patient. Dm 2 diagnosed in 2010. On toujeo 35. She was on trulicity in the past but could not afford at the time. States that she was unsure why her A1c increased. States she was adherent with meds. Off trulicity since Sept. No hypoglycemia. States this AM BG was 147. Occasional paresthesias. States when on trulicity it was well controlled. Snacks on ice cream sandwich- daily. She does one regular coke a day.       PAST MEDICAL HISTORY/PAST SURGICAL HISTORY:  Reviewed in EPIC    SOCIAL HISTORY: No T/A    FAMILY HISTORY:  + Dm 2. No known thyroid disease    MEDICATIONS/ALLERGIES: The patient's MedCard has been updated and reviewed.      ROS:   Constitutional: No recent significant weight change  Eyes: No recent visual changes  ENT: No dysphagia  Cardiovascular: Denies current anginal symptoms  Respiratory: Denies current respiratory difficulty  Gastrointestinal: Denies recent bowel disturbances  GenitoUrinary - No dysuria  Skin: No new skin rash  Neurologic: No focal neurologic complaints  Remainder ROS negative        PE:    GENERAL: Well developed, well nourished.  PSYCH:  appropriate mood and affect  EYES:  PERRL, EOM intact.  ENT: Nares patent, oropharynx clear, mucosa pink,   NECK: Supple, trachea midline, No palpable thyroid nodules  CHEST: Resp even and unlabored, CTA bilateral.  CARDIAC: RRR, S1, S2 heard, no murmurs, rubs, S3, or S4  ABDOMEN: Soft, non-tender, non-distended;  No organomegaly  VASCULAR:  DP pulses +2/4 bilaterally, no edema  NEURO: Gait steady, CN II-VII grossly intact  SKIN: No areas of breakdown, no acanthosis nigracans.  Feet: normal monofilament. No cuts or abrasions    LABS   Results for AMBER SAHA (MRN [id number]) as of 1/12/2018 08:58   Ref. Range 12/1/2017 08:41   Sodium Latest Ref Range: 136 - 145 mmol/L 138   Potassium Latest Ref Range: 3.5 - 5.1 mmol/L 4.1   Chloride Latest Ref Range: 95 - 110 mmol/L 99   CO2 Latest Ref Range:  22 - 31 mmol/L 30   Anion Gap Latest Ref Range: 8 - 16 mmol/L 9   BUN, Bld Latest Ref Range: 7 - 18 mg/dL 18   Creatinine Latest Ref Range: 0.50 - 1.40 mg/dL 0.53   eGFR if non African American Latest Ref Range: >60 mL/min/1.73 m^2 >60   eGFR if  Latest Ref Range: >60 mL/min/1.73 m^2 >60   Glucose Latest Ref Range: 70 - 110 mg/dL 190 (H)   Calcium Latest Ref Range: 8.4 - 10.2 mg/dL 9.9   Hemoglobin A1C Latest Ref Range: 0.0 - 5.6 % 10.6 (H)   Estimated Avg Glucose Latest Ref Range: 68 - 131 mg/dL 258 (H)       ASSESSMENT/PLAN:  1. DM 2- uncontrolled with neuropathy. No glucose logs with her so difficult to make significant changes. Will restart trulicity. States that she was able to get patient assistance, but had no lace to have it delivered. Will get her set up with our patient assistance program. Will set with DM education. Advised that she bring a log. Past due for eye exa. Will set up eye camera. Will check lipid panel at f/u. Not on statin. I think she can benefit from diet modification. Has symptoms of neuropathy.     2. HTN- on ARB. Discussed low Na diet.       FOLLOWUP  Dm Education  Eye camera  Patient assistance- Trulicity  F/u 3 months with NP with CMP, A1c, urine micro, FLP

## 2018-01-12 NOTE — LETTER
January 12, 2018      TOMASA Ellis Jr., MD  [address]  [facility] [address]           [facility]  [address] [facility] [address] [place] [address]  Phone: [phone]  Fax: [phone]          Patient: Milady Bright   MR Number: [id number]   YOB: 1945   Date of Visit: 1/12/2018       Dear Dr. TOMASA Ellis Jr.:    Thank you for referring Milady Bright to me for evaluation. Attached you will find relevant portions of my assessment and plan of care.    If you have questions, please do not hesitate to call me. I look forward to following Milady Bright along with you.    Sincerely,    Say Dai, DO [place]  CC:  No Recipients    If you would like to receive this communication electronically, please contact [email or web address] or [phone] to request more information on [email or web address] Link access.    For providers and/or their staff who would like to refer a patient to [facility], please contact us through our one-stop-shop provider referral line, [facility], at [phone].    If you feel you have received this communication in error or would no longer like to receive these types of communications, please e-mail [email or web address]

## 2018-01-24 ENCOUNTER — CLINICAL SUPPORT (OUTPATIENT)
Dept: DIABETES | Facility: CLINIC | Age: 73
End: 2018-01-24
Payer: MEDICARE

## 2018-01-24 ENCOUNTER — OFFICE VISIT (OUTPATIENT)
Dept: NEUROLOGY | Facility: CLINIC | Age: 73
End: 2018-01-24
Payer: MEDICARE

## 2018-01-24 ENCOUNTER — TELEPHONE (OUTPATIENT)
Dept: ENDOCRINOLOGY | Facility: CLINIC | Age: 73
End: 2018-01-24

## 2018-01-24 ENCOUNTER — TELEPHONE (OUTPATIENT)
Dept: NEUROLOGY | Facility: CLINIC | Age: 73
End: 2018-01-24

## 2018-01-24 VITALS
SYSTOLIC BLOOD PRESSURE: 142 MMHG | WEIGHT: 168.13 LBS | HEIGHT: 67 IN | DIASTOLIC BLOOD PRESSURE: 68 MMHG | RESPIRATION RATE: 18 BRPM | HEART RATE: 84 BPM | BODY MASS INDEX: 26.39 KG/M2

## 2018-01-24 VITALS — BODY MASS INDEX: 26.51 KG/M2 | WEIGHT: 168.88 LBS | HEIGHT: 67 IN

## 2018-01-24 DIAGNOSIS — I67.2 ATHEROSCLEROTIC CEREBROVASCULAR DISEASE: Primary | ICD-10-CM

## 2018-01-24 DIAGNOSIS — E11.9 TYPE 2 DIABETES MELLITUS WITHOUT COMPLICATION, WITHOUT LONG-TERM CURRENT USE OF INSULIN: ICD-10-CM

## 2018-01-24 DIAGNOSIS — R41.840 ATTENTION AND CONCENTRATION DEFICIT: ICD-10-CM

## 2018-01-24 PROCEDURE — 3008F BODY MASS INDEX DOCD: CPT | Mod: S$GLB,,, | Performed by: PSYCHIATRY & NEUROLOGY

## 2018-01-24 PROCEDURE — 99999 PR PBB SHADOW E&M-EST. PATIENT-LVL II: CPT | Mod: PBBFAC,,,

## 2018-01-24 PROCEDURE — 1126F AMNT PAIN NOTED NONE PRSNT: CPT | Mod: S$GLB,,, | Performed by: PSYCHIATRY & NEUROLOGY

## 2018-01-24 PROCEDURE — 99214 OFFICE O/P EST MOD 30 MIN: CPT | Mod: S$GLB,,, | Performed by: PSYCHIATRY & NEUROLOGY

## 2018-01-24 PROCEDURE — 99999 PR PBB SHADOW E&M-EST. PATIENT-LVL IV: CPT | Mod: PBBFAC,,, | Performed by: PSYCHIATRY & NEUROLOGY

## 2018-01-24 PROCEDURE — G0108 DIAB MANAGE TRN  PER INDIV: HCPCS | Mod: S$GLB,,, | Performed by: DIETITIAN, REGISTERED

## 2018-01-24 PROCEDURE — 1159F MED LIST DOCD IN RCRD: CPT | Mod: S$GLB,,, | Performed by: PSYCHIATRY & NEUROLOGY

## 2018-01-24 NOTE — PROGRESS NOTES
Subjective:         Patient ID: Milady Bright is a 72 y.o.  female who presents for follow up of memory    Initial / Last History of Present Illness:    Impression at last visit:     Memory loss     Current Assessment & Plan       Primarily subjective, and I think to great extent related to worry and distraction.  I don't see any sign of dementia.  She feels the Aricept is beneficial therefore we'll continue it.  Continue with control of blood pressure, regular exercise (brisk walk 30 minutes 5 days a week), healthy diet.  Encouraged patient I don't see any significant memory impairment, maintain mindfulness of her activity and avoiding distraction should help in the future.         Atherosclerotic cerebrovascular disease - Primary       Interval history since last visit:    Since our last visit, she saw her PCP and dose of Aricept was increased; she feels she developed head heaviness, and somehow was referred to another neurologist (Dr. Zimmerman) who had her stop the Aricept, and her head heaviness improved.     She is still very worried about her memory however. We reviewed her previous MRI of the brain, has moderate degree of nonspecific scattered WM lesion.     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      blood sugar diagnostic (ACCU-CHEK SMARTVIEW TEST STRIP) Strp 1 strip by Misc.(Non-Drug; Combo Route) route 3 (three) times daily. 100 strip 11    calcium-vitamin D3 (CALCIUM 500 + D) 500 mg(1,250mg) -200 unit per tablet Take 1  "tablet by mouth 2 (two) times daily with meals.      estradiol (ESTRACE) 0.01 % (0.1 mg/gram) vaginal cream Place 1 g vaginally every 7 days.      hydrochlorothiazide (HYDRODIURIL) 25 MG tablet TAKE 1 TABLET IN THE MORNING FOR BLOOD PRESSURE 90 tablet 3    hydrocodone-acetaminophen 7.5-325mg (NORCO) 7.5-325 mg per tablet TK 1 T PO BID  0    insulin glargine, TOUJEO, (TOUJEO) 300 unit/mL (1.5 mL) InPn pen Inject 35 Units into the skin once daily. 3 Syringe 11    irbesartan (AVAPRO) 300 MG tablet TAKE 1 TABLET (300 MG TOTAL) BY MOUTH DAILY. 90 tablet 3    lancing device with lancets Kit 1 lancet by Misc.(Non-Drug; Combo Route) route 3 (three) times daily. 100 each 11    pen needle, diabetic 32 gauge x 5/32" Ndle 1 each by Misc.(Non-Drug; Combo Route) route Daily. 90 each 3    potassium chloride (MICRO-K) 10 MEQ CpSR TAKE 1 CAPSULE ONE TIME DAILY 90 capsule 0    dulaglutide (TRULICITY) 1.5 mg/0.5 mL PnIj Inject 1.5 mg into the skin once a week. 4 Syringe 4     No current facility-administered medications for this visit.          Review of Systems  Review of Systems    Objective:        Vitals:    01/24/18 0833   BP: (!) 142/68   Pulse: 84   Resp: 18     Body mass index is 26.33 kg/m².  Neurologic Exam     Mental Status   Oriented to person, place, and time.   Oriented to person.   Oriented to place.   Registration: recalls 3 of 3 objects. Recall at 5 minutes: recalls 3 of 3 objects. Follows 3 step commands.   Attention: normal. Concentration: normal.   Speech: speech is normal   Level of consciousness: alert  Knowledge: good.   Able to name object. Able to repeat. Normal comprehension.   WORLD-DLROW  MMSE score 30/30  Normal clock drawaing, placed 12-6-3-9 positions first, normal length and placement of hands     Motor Exam   Right arm pronator drift: absent  Left arm pronator drift: absentNeck flex/ext, SCM, shoulder shrug 5/5       Physical Exam   Constitutional: She is oriented to person, place, and time. "   Neurological: She is oriented to person, place, and time.   Psychiatric: Her speech is normal.         Data Review:  Results for AMBER SAHA (MRN [id number]) as of 1/24/2018 09:24   Ref. Range 12/1/2017 08:41   Sodium Latest Ref Range: 136 - 145 mmol/L 138   Potassium Latest Ref Range: 3.5 - 5.1 mmol/L 4.1   Chloride Latest Ref Range: 95 - 110 mmol/L 99   CO2 Latest Ref Range: 22 - 31 mmol/L 30   Anion Gap Latest Ref Range: 8 - 16 mmol/L 9   BUN, Bld Latest Ref Range: 7 - 18 mg/dL 18   Creatinine Latest Ref Range: 0.50 - 1.40 mg/dL 0.53   eGFR if non African American Latest Ref Range: >60 mL/min/1.73 m^2 >60   eGFR if  Latest Ref Range: >60 mL/min/1.73 m^2 >60   Glucose Latest Ref Range: 70 - 110 mg/dL 190 (H)   Calcium Latest Ref Range: 8.4 - 10.2 mg/dL 9.9   Hemoglobin A1C Latest Ref Range: 0.0 - 5.6 % 10.6 (H)   Estimated Avg Glucose Latest Ref Range: 68 - 131 mg/dL 258 (H)     Results for AMBER SAHA (MRN [id number]) as of 1/24/2018 09:24   Ref. Range 8/25/2017 07:56   Cholesterol Latest Ref Range: 120 - 199 mg/dL 179   HDL Latest Ref Range: 40 - 75 mg/dL 51   LDL Cholesterol Latest Ref Range: 63.0 - 159.0 mg/dL 109.8   Total Cholesterol/HDL Ratio Latest Ref Range: 2.0 - 5.0  3.5   Triglycerides Latest Ref Range: 30 - 150 mg/dL 91     Assessment:        1. Atherosclerotic cerebrovascular disease    2. Attention and concentration deficit    3. Type 2 diabetes mellitus without complication, without long-term current use of insulin           Plan:         Problem List Items Addressed This Visit        Neuro    Atherosclerotic cerebrovascular disease - Primary    Attention and concentration deficit    Relevant Orders    Ambulatory Referral to Neuropsychology       Endocrine    Type 2 diabetes mellitus without complication, without long-term current use of insulin        a. Attention: Remember that inattention and lack of focus are major culprits to forgetting information so be sure and practice  paying attention for adequate learning of information. If you rely on passive attention to remembering something (e.g., yeah, uh-huh approach), youll find you cannot recall it later. I recommend the following to improve attention, which may aid in later recall:   i. Reduce distractions in the area as much as possible.  ii. Look at the person as they are speaking to you.   iii. Paraphrase as they are speaking  iv. Write down important pieces of information   v. Ask them to repeat if you zone out.   vi. Have them simplify and reduce information that you need to attend to during conversation.   vii. Have visual cues to remind you if you need to do something later.  b. Processing Speed:   i. Using multiple modalities (e.g., listening, writing notes, asking questions, recording) to learn new information is likely to allow additional time for processing, thus improving memory for the material.   ii. Allowing sufficient time to complete tasks will reduce frustration and help to ensure completion.  c. Executive Functioning:  i. Dont attempt to multi-task.  Separate tasks so that each can be completed one at a time.  ii. Consider using a calendar/day planner, as that may be effective to help you plan and stay on track.  Color-coding specific tasks by importance may add additional benefit to your planner.  iii. Break down large projects into smaller tasks and write down the steps to completing the task.  Taking notes while reading can help with recall.  d. Storing Information: Use the below strategies to help you further enhance how information is stored.  i. Rehearse - Immediately after seeing/hearing something, try to recall it.  Wait a few minutes, then check again.  Gradually lengthen the intervals between rehearsals.  ii. Repetition of learned material is critical to ensure storage of information to be learned. Self-test at home to ensure learning.  iii. Write down important information to improve your attention  and focus and to have something to look back on when you need to recall it.  iv. Make sure the person doesnt rattle off, but presents in a clear, logical, and unhurried manner.   e. Recalling Information:  i. Jog your memory - Lose something?  Think back to when you last had it.  What did you do next?  And after that?  Mentally walk yourself through each activity that followed.  Prodding your memory this way may enable you to recall the location of the missing item.  ii. Use a cue - Symbolic reminders (the proverbial string around the finger) are helpful.  So too are memos, timers, calendar notes, etc.--keep them in visible, appropriate places.  iii. Get organized - Have fixed locations for all important papers, key phone numbers, medications, keys, wallet, glasses, tools, etc.  iv. Develop routines - Routines can anchor memories so they do not drift away.    f. Sleep Hygiene: Poor sleep has a negative effect on cognition. Several strategies have been shown to improve sleep:   i. Caffeine intake in the afternoon and evening, as well as stuffing oneself at supper, can decrease the quality of restful sleep throughout the night.   ii. Bedtime and wake-up times should be consistent every night and morning so the body becomes used to a single routine, even on the weekends.  iii. Engage in daily physical activity, but not 2-3 hours before bedtime.   iv. No technology use (television, computer, iPad) 1-2 hours before bed.   v. Have a wind down routine (e.g., soft lights in the house, bath before bed, reduced fluid intake, songs, reading, less noise) to promote sleep readiness.   vi. Visit the www.sleepfoundation.org for more strategies.   g. Practice good cognitive hygiene:  i. Engage in regular exercise, which increases alertness and arousal and can improve attention and focus.  Consider lower impact exercises, such as yoga or light walking.  ii. Get a good nights sleep, as this can enhance alertness and  cognition.  iii. Eat healthy foods and balanced meals. It is notable that research indicates certain nutrients may aid in brain function, such as B vitamins (especially B6, B12, and folic acid), antioxidants (such as vitamins C and E, and beta carotene), and Omega-3 fatty acids. Talk with your physician or nutritionist about whats right for you.   iv. Keep your brain active. Find activities to stay mentally active, such as reading, games (cards, checkers), puzzles (crosswords, Sudoku, jig saw), crafts ([employer], woodworking), gardening, or participating in activities in the community.  v. Stay socially engaged. Continue staying active with your family and friends.  vi.   Follow-up in about 4 months (around 5/24/2018).       Thank you very much for the opportunity to assist in this patient's care.  If you have any questions or concerns, please do not hesitate to contact me at any time.     Sincerely,  Clarke Montesinos, DO

## 2018-01-24 NOTE — PROGRESS NOTES
Diabetes Education  Author: Cassandra Lovell RD, CDE  Date: 1/24/2018    Diabetes Education Visit  Diabetes Education Record Assessment/Progress: Initial    Diabetes Type  Diabetes Type : Type II    Diabetes History  Diabetes Diagnosis: 5-10 years (Diagnosed in 2010)    Nutrition  Meal Planning: 3 meals per day, snacks between meal (Patient states she has been trying to eat salads and less carbs.  She reports that she gets overwhelmed with diet and trying to figure out what to eat.  Snacks on sweets at night.  Sometimes regular soda)    Monitoring   Monitoring:  (Has up to date meter)  Self Monitoring :  (Yes)  Blood Glucose Logs: Yes (Will send to provider to review.  Checking in am only)    Exercise   Exercise Type: none    Current Diabetes Treatment   Current Treatment: Injectable (Stopped Toujeo and now taking Trulicity)    Social History  Preferred Learning Method: Face to Face  Primary Support: Self     Barriers to Change  Barriers to Change: None  Learning Challenges : None    Readiness to Learn   Readiness to Learn : Acceptance    Cultural Influences  Cultural Influences: No    Diabetes Education Assessment/Progress  Diabetes Disease Process (diabetes disease process and treatment options): Discussion, Comprehends Key Points (Reviewed goal blood sugars and A1c value.  )  Nutrition (Incorporating nutritional management into one's lifestyle): Discussion, Written Materials Provided, Instructed, Comprehends Key Points (Reviewed carb sources and appropriate amounts per meal and snack. Discussed label reading and apps she could use for out to eat meals.  Went over alternative snack choices and sugar free drink alternatives)  Medications (states correct name, dose, onset, peak, duration, side effects & timing of meds): Discussion (Patient now on Trulicity and doing well on lower dose.  States she took the higher dose in the past and would like to get on larger dose next month if possible)  Monitoring (monitoring  blood glucose/other parameters & using results): Discussion, Instructed, Comprehends Key Points (Gave new log sheet and ask that she alternate her testing times.  Bring logs into each visit for review)  Acute Complications (preventing, detecting, and treating acute complications): Discussion (No hypoglycemia noted.  Symptoms, prevention and treatment of hypoglycemia reviewed)    Goals  Patient has selected/evaluated goals during today's session: Yes, selected  Healthy Eating: Set (Will work on eliminating regular sodas)    Diabetes Care Plan/Intervention  Education Plan/Intervention:  (Will send log to provider for review and possible titration of Trulicity.  Can f/u with education as needed in interim.  Written materials provided and encouraged pt to call with any questions/concerns.  )    Diabetes Meal Plan  Calories: 1500  Carbohydrate Per Meal: 30-45g  Carbohydrate Per Snack : 15-20g    Education Units of Time   Time Spent: 60 min    Health Maintenance Topics with due status: Not Due       Topic Last Completion Date    Colonoscopy 04/20/2011    Lipid Panel 08/25/2017    DEXA SCAN 10/11/2017    Hemoglobin A1c 12/01/2017    Foot Exam 01/12/2018     Health Maintenance Due   Topic Date Due    TETANUS VACCINE  02/22/1961    Zoster Vaccine  02/22/2003    Eye Exam  09/15/2017    Mammogram  10/12/2017    Pap Smear  11/11/2017    Urine Microalbumin  02/14/2018

## 2018-01-24 NOTE — PATIENT INSTRUCTIONS
a. Attention: Remember that inattention and lack of focus are major culprits to forgetting information so be sure and practice paying attention for adequate learning of information. If you rely on passive attention to remembering something (e.g., yeah, uh-huh approach), youll find you cannot recall it later. I recommend the following to improve attention, which may aid in later recall:   i. Reduce distractions in the area as much as possible.  ii. Look at the person as they are speaking to you.   iii. Paraphrase as they are speaking  iv. Write down important pieces of information   v. Ask them to repeat if you zone out.   vi. Have them simplify and reduce information that you need to attend to during conversation.   vii. Have visual cues to remind you if you need to do something later.  b. Processing Speed:   i. Using multiple modalities (e.g., listening, writing notes, asking questions, recording) to learn new information is likely to allow additional time for processing, thus improving memory for the material.   ii. Allowing sufficient time to complete tasks will reduce frustration and help to ensure completion.  c. Executive Functioning:  i. Dont attempt to multi-task.  Separate tasks so that each can be completed one at a time.  ii. Consider using a calendar/day planner, as that may be effective to help you plan and stay on track.  Color-coding specific tasks by importance may add additional benefit to your planner.  iii. Break down large projects into smaller tasks and write down the steps to completing the task.  Taking notes while reading can help with recall.  d. Storing Information: Use the below strategies to help you further enhance how information is stored.  i. Rehearse - Immediately after seeing/hearing something, try to recall it.  Wait a few minutes, then check again.  Gradually lengthen the intervals between rehearsals.  ii. Repetition of learned material is critical to ensure storage of  information to be learned. Self-test at home to ensure learning.  iii. Write down important information to improve your attention and focus and to have something to look back on when you need to recall it.  iv. Make sure the person doesnt rattle off, but presents in a clear, logical, and unhurried manner.   e. Recalling Information:  i. Jog your memory - Lose something?  Think back to when you last had it.  What did you do next?  And after that?  Mentally walk yourself through each activity that followed.  Prodding your memory this way may enable you to recall the location of the missing item.  ii. Use a cue - Symbolic reminders (the proverbial string around the finger) are helpful.  So too are memos, timers, calendar notes, etc.--keep them in visible, appropriate places.  iii. Get organized - Have fixed locations for all important papers, key phone numbers, medications, keys, wallet, glasses, tools, etc.  iv. Develop routines - Routines can anchor memories so they do not drift away.    f. Sleep Hygiene: Poor sleep has a negative effect on cognition. Several strategies have been shown to improve sleep:   i. Caffeine intake in the afternoon and evening, as well as stuffing oneself at supper, can decrease the quality of restful sleep throughout the night.   ii. Bedtime and wake-up times should be consistent every night and morning so the body becomes used to a single routine, even on the weekends.  iii. Engage in daily physical activity, but not 2-3 hours before bedtime.   iv. No technology use (television, computer, iPad) 1-2 hours before bed.   v. Have a wind down routine (e.g., soft lights in the house, bath before bed, reduced fluid intake, songs, reading, less noise) to promote sleep readiness.   vi. Visit the www.sleepfoundation.org for more strategies.   g. Practice good cognitive hygiene:  i. Engage in regular exercise, which increases alertness and arousal and can improve attention and focus.  Consider  lower impact exercises, such as yoga or light walking.  ii. Get a good nights sleep, as this can enhance alertness and cognition.  iii. Eat healthy foods and balanced meals. It is notable that research indicates certain nutrients may aid in brain function, such as B vitamins (especially B6, B12, and folic acid), antioxidants (such as vitamins C and E, and beta carotene), and Omega-3 fatty acids. Talk with your physician or nutritionist about whats right for you.   iv. Keep your brain active. Find activities to stay mentally active, such as reading, games (cards, checkers), puzzles (crosswords, Sudoku, jig saw), crafts (models, woodworking), gardening, or participating in activities in the community.  v. Stay socially engaged. Continue staying active with your family and friends.

## 2018-01-24 NOTE — TELEPHONE ENCOUNTER
Patient seen for dm ed today.  She brought in log and would like to know if she can be increased to the higher dose of Trulicity next time she refills the med.  She states she has been on the higher dose in the past and not having any side effects since restarting it.  She stopped the Toujeo.  All blood sugars below are fasting.

## 2018-01-29 PROCEDURE — 92250 FUNDUS PHOTOGRAPHY W/I&R: CPT | Mod: S$GLB,,, | Performed by: INTERNAL MEDICINE

## 2018-01-30 ENCOUNTER — TELEPHONE (OUTPATIENT)
Dept: ENDOCRINOLOGY | Facility: CLINIC | Age: 73
End: 2018-01-30

## 2018-01-30 DIAGNOSIS — E11.319 DIABETIC RETINOPATHY OF RIGHT EYE ASSOCIATED WITH TYPE 2 DIABETES MELLITUS, MACULAR EDEMA PRESENCE UNSPECIFIED, UNSPECIFIED RETINOPATHY SEVERITY: Primary | ICD-10-CM

## 2018-01-31 ENCOUNTER — TELEPHONE (OUTPATIENT)
Dept: FAMILY MEDICINE | Facility: CLINIC | Age: 73
End: 2018-01-31

## 2018-01-31 NOTE — TELEPHONE ENCOUNTER
Schedule with optometry for eye exam in 3 months. There is mild retinopathy noted in the right eye.

## 2018-01-31 NOTE — TELEPHONE ENCOUNTER
Spoke with pt and recc that she see eye md in 3 months. Her eye md is Dr. Carranza. She will make an appointment with him.

## 2018-01-31 NOTE — TELEPHONE ENCOUNTER
----- Message from Charles Castaneda, OD sent at 1/29/2018  1:18 PM CST -----  Retinopathy right eye, rtc 3 mos routine dilated eye exam.

## 2018-02-01 ENCOUNTER — PATIENT MESSAGE (OUTPATIENT)
Dept: ENDOCRINOLOGY | Facility: CLINIC | Age: 73
End: 2018-02-01

## 2018-02-05 NOTE — ASSESSMENT & PLAN NOTE
Do not suspect dementia - more likely psychosocial stressors impacting concentration and attention.  Detailed neuropsychological testing to further evaluate.  Discussed cognitive habits and practices to improve function

## 2018-02-08 ENCOUNTER — TELEPHONE (OUTPATIENT)
Dept: PHARMACY | Facility: CLINIC | Age: 73
End: 2018-02-08

## 2018-02-22 ENCOUNTER — TELEPHONE (OUTPATIENT)
Dept: NEUROLOGY | Facility: CLINIC | Age: 73
End: 2018-02-22

## 2018-02-22 NOTE — TELEPHONE ENCOUNTER
Attempted to reach pt today to reschedule her April 3/2018 appointment. No answer. Left detailed message on her voicemail to call office back to reschedule.

## 2018-04-04 ENCOUNTER — LAB VISIT (OUTPATIENT)
Dept: LAB | Facility: HOSPITAL | Age: 73
End: 2018-04-04
Attending: INTERNAL MEDICINE
Payer: MEDICARE

## 2018-04-04 LAB
ALBUMIN SERPL BCP-MCNC: 4.2 G/DL
ALP SERPL-CCNC: 92 U/L
ALT SERPL W/O P-5'-P-CCNC: 18 U/L
ANION GAP SERPL CALC-SCNC: 9 MMOL/L
AST SERPL-CCNC: 21 U/L
BILIRUB SERPL-MCNC: 0.7 MG/DL
BUN SERPL-MCNC: 17 MG/DL
CALCIUM SERPL-MCNC: 10.2 MG/DL
CHLORIDE SERPL-SCNC: 101 MMOL/L
CHOLEST SERPL-MCNC: 187 MG/DL
CHOLEST/HDLC SERPL: 4 {RATIO}
CO2 SERPL-SCNC: 30 MMOL/L
CREAT SERPL-MCNC: 0.8 MG/DL
EST. GFR  (AFRICAN AMERICAN): >60 ML/MIN/1.73 M^2
EST. GFR  (NON AFRICAN AMERICAN): >60 ML/MIN/1.73 M^2
ESTIMATED AVG GLUCOSE: 160 MG/DL
GLUCOSE SERPL-MCNC: 161 MG/DL
HBA1C MFR BLD HPLC: 7.2 %
HDLC SERPL-MCNC: 47 MG/DL
HDLC SERPL: 25.1 %
LDLC SERPL CALC-MCNC: 120.4 MG/DL
NONHDLC SERPL-MCNC: 140 MG/DL
POTASSIUM SERPL-SCNC: 4.5 MMOL/L
PROT SERPL-MCNC: 7.8 G/DL
SODIUM SERPL-SCNC: 140 MMOL/L
TRIGL SERPL-MCNC: 98 MG/DL

## 2018-04-04 PROCEDURE — 83036 HEMOGLOBIN GLYCOSYLATED A1C: CPT

## 2018-04-04 PROCEDURE — 36415 COLL VENOUS BLD VENIPUNCTURE: CPT | Mod: PO

## 2018-04-04 PROCEDURE — 80053 COMPREHEN METABOLIC PANEL: CPT

## 2018-04-04 PROCEDURE — 80061 LIPID PANEL: CPT

## 2018-04-11 ENCOUNTER — OFFICE VISIT (OUTPATIENT)
Dept: ENDOCRINOLOGY | Facility: CLINIC | Age: 73
End: 2018-04-11
Payer: MEDICARE

## 2018-04-11 VITALS
DIASTOLIC BLOOD PRESSURE: 68 MMHG | HEIGHT: 67 IN | BODY MASS INDEX: 26.42 KG/M2 | HEART RATE: 92 BPM | SYSTOLIC BLOOD PRESSURE: 136 MMHG | WEIGHT: 168.31 LBS

## 2018-04-11 DIAGNOSIS — E11.9 TYPE 2 DIABETES MELLITUS WITHOUT COMPLICATION, WITHOUT LONG-TERM CURRENT USE OF INSULIN: Primary | ICD-10-CM

## 2018-04-11 DIAGNOSIS — I10 BENIGN ESSENTIAL HTN: ICD-10-CM

## 2018-04-11 PROCEDURE — 99214 OFFICE O/P EST MOD 30 MIN: CPT | Mod: S$GLB,,, | Performed by: NURSE PRACTITIONER

## 2018-04-11 PROCEDURE — 3075F SYST BP GE 130 - 139MM HG: CPT | Mod: CPTII,S$GLB,, | Performed by: NURSE PRACTITIONER

## 2018-04-11 PROCEDURE — 3078F DIAST BP <80 MM HG: CPT | Mod: CPTII,S$GLB,, | Performed by: NURSE PRACTITIONER

## 2018-04-11 PROCEDURE — 99999 PR PBB SHADOW E&M-EST. PATIENT-LVL IV: CPT | Mod: PBBFAC,,, | Performed by: NURSE PRACTITIONER

## 2018-04-11 PROCEDURE — 3045F PR MOST RECENT HEMOGLOBIN A1C LEVEL 7.0-9.0%: CPT | Mod: CPTII,S$GLB,, | Performed by: NURSE PRACTITIONER

## 2018-04-11 RX ORDER — METFORMIN HYDROCHLORIDE 500 MG/1
500 TABLET ORAL 2 TIMES DAILY WITH MEALS
Qty: 60 TABLET | Refills: 6 | Status: SHIPPED | OUTPATIENT
Start: 2018-04-11 | End: 2018-10-28 | Stop reason: SDUPTHER

## 2018-04-11 NOTE — PROGRESS NOTES
Subjective:       Patient ID: Milady Bright is a 73 y.o. female.    Chief Complaint: Diabetes    HPI new to me--Pt is a 73 y.o. wf  with a diagnosis of Type 2 diabetes mellitus diagnosed approximately 20110, as well as chronic conditions pending review including HTN, HLP.  Other pertinent medical and social information noted includes, but not limited to: NA.   Pt initially treated with Toujeo 35 units--but found she was hungry all the time.  Primary started her on Trulicity which has worked well--but cost prohibitive.  Thinks she was on metformin in the past and possibly with some diarrhea.  May have been on Januvia and or glimipiride. Not sure if and when--or why agent was changed.  Overall, glucoses quite reasonable--but the Trulicity will expedite pt into the pharmacy gap.  No  Focal complaints.           Review of Systems    Objective:      Physical Exam   Constitutional: She is oriented to person, place, and time. She appears well-developed and well-nourished.   Appears younger than age, well groomed.    HENT:   Head: Normocephalic and atraumatic.   Nose: Nose normal.   Mouth/Throat: Oropharynx is clear and moist.   Eyes: Conjunctivae and EOM are normal. Pupils are equal, round, and reactive to light.   Neck: Normal range of motion. Neck supple. No tracheal deviation present. No thyromegaly present.   Cardiovascular: Normal rate, regular rhythm, normal heart sounds and intact distal pulses.    Pulmonary/Chest: Effort normal and breath sounds normal.   Musculoskeletal: Normal range of motion. She exhibits no deformity.   Feet:    Neurological: She is alert and oriented to person, place, and time.   Skin: Skin is warm and dry.   Psychiatric: She has a normal mood and affect. Her behavior is normal. Judgment and thought content normal.         Hemoglobin A1C   Date Value Ref Range Status   04/04/2018 7.2 (H) 4.0 - 5.6 % Final     Comment:     According to ADA guidelines, hemoglobin A1c <7.0% represents  optimal  control in non-pregnant diabetic patients. Different  metrics may apply to specific patient populations.   Standards of Medical Care in Diabetes-2016.  For the purpose of screening for the presence of diabetes:  <5.7%     Consistent with the absence of diabetes  5.7-6.4%  Consistent with increasing risk for diabetes   (prediabetes)  >or=6.5%  Consistent with diabetes  Currently, no consensus exists for use of hemoglobin A1c  for diagnosis of diabetes for children.  This Hemoglobin A1c assay has significant interference with fetal   hemoglobin   (HbF). The results are invalid for patients with abnormal amounts of   HbF,   including those with known Hereditary Persistence   of Fetal Hemoglobin. Heterozygous hemoglobin variants (HbAS, HbAC,   HbAD, HbAE, HbA2) do not significantly interfere with this assay;   however, presence of multiple variants in a sample may impact the %   interference.     12/01/2017 10.6 (H) 0.0 - 5.6 % Final     Comment:     Reference Interval:  5.0 - 5.6 Normal   5.7 - 6.4 High Risk   > 6.5 Diabetic    Hgb A1c results are standardized based on the (NGSP) National   Glycohemoglobin Standardization Program.    Hemoglobin A1C levels are related to mean serum/plasma glucose   during the preceding 2-3 months.        08/25/2017 7.6 (H) 0.0 - 5.6 % Final     Comment:     Reference Interval:  5.0 - 5.6 Normal   5.7 - 6.4 High Risk   > 6.5 Diabetic    Hgb A1c results are standardized based on the (NGSP) National   Glycohemoglobin Standardization Program.    Hemoglobin A1C levels are related to mean serum/plasma glucose   during the preceding 2-3 months.            Chemistry        Component Value Date/Time     04/04/2018 0755     08/05/2015 0840    K 4.5 04/04/2018 0755    K 4.1 08/05/2015 0840     04/04/2018 0755    CL 99 08/05/2015 0840    CO2 30 (H) 04/04/2018 0755    BUN 17 04/04/2018 0755    CREATININE 0.8 04/04/2018 0755    CREATININE 0.64 08/05/2015 0840     (H)  04/04/2018 0755        Component Value Date/Time    CALCIUM 10.2 04/04/2018 0755    ALKPHOS 92 04/04/2018 0755    AST 21 04/04/2018 0755    AST 27 02/12/2016 0728    ALT 18 04/04/2018 0755    BILITOT 0.7 04/04/2018 0755    ESTGFRAFRICA >60.0 04/04/2018 0755    EGFRNONAA >60.0 04/04/2018 0755        Lab Results   Component Value Date    LDLCALC 120.4 04/04/2018     Lab Results   Component Value Date    TSH 1.580 02/14/2017       Assessment:     1. Type 2 diabetes mellitus without complication, without long-term current use of insulin  metFORMIN (GLUCOPHAGE) 500 MG tablet  Chronic-stable- see plan     SITagliptin (JANUVIA) 50 MG Tab    Hemoglobin A1c   2. Benign essential HTN  -chronic-stable- no chagnes.        Plan:          Finish trulicity  Will start januvia 50 mg daily ----pt will see if she has some at home---will likely increase to 100  Mg   Will add metformin---will start with slow titration---500 mg qam  For 1 week----and then increase to twice a day.     ORDERS 04/11/2018    4 mo with a1c priror     30 min >50% counseling on different drugs,  Price concerns.  DDP likley to have less impact on pharmacy gap

## 2018-04-11 NOTE — PATIENT INSTRUCTIONS
Finish trulicity  Will start januvia 50 mg daily ----pt will see if she has some at home---will likely increase to 100  Mg   Will add metformin---will start with slow titration---500 mg qam  For 1 week----and then increase to twice a day.

## 2018-06-04 ENCOUNTER — PATIENT MESSAGE (OUTPATIENT)
Dept: ENDOCRINOLOGY | Facility: CLINIC | Age: 73
End: 2018-06-04

## 2018-08-01 ENCOUNTER — TELEPHONE (OUTPATIENT)
Dept: PHARMACY | Facility: CLINIC | Age: 73
End: 2018-08-01

## 2018-08-03 ENCOUNTER — LAB VISIT (OUTPATIENT)
Dept: LAB | Facility: HOSPITAL | Age: 73
End: 2018-08-03
Attending: NURSE PRACTITIONER
Payer: MEDICARE

## 2018-08-03 DIAGNOSIS — E11.9 TYPE 2 DIABETES MELLITUS WITHOUT COMPLICATION, WITHOUT LONG-TERM CURRENT USE OF INSULIN: ICD-10-CM

## 2018-08-03 LAB
ESTIMATED AVG GLUCOSE: 177 MG/DL
HBA1C MFR BLD HPLC: 7.8 %

## 2018-08-03 PROCEDURE — 36415 COLL VENOUS BLD VENIPUNCTURE: CPT | Mod: PO

## 2018-08-03 PROCEDURE — 83036 HEMOGLOBIN GLYCOSYLATED A1C: CPT

## 2018-08-22 ENCOUNTER — TELEPHONE (OUTPATIENT)
Dept: PHARMACY | Facility: CLINIC | Age: 73
End: 2018-08-22

## 2018-08-22 ENCOUNTER — OFFICE VISIT (OUTPATIENT)
Dept: ENDOCRINOLOGY | Facility: CLINIC | Age: 73
End: 2018-08-22
Payer: MEDICARE

## 2018-08-22 VITALS
HEART RATE: 82 BPM | HEIGHT: 67 IN | BODY MASS INDEX: 26.45 KG/M2 | SYSTOLIC BLOOD PRESSURE: 132 MMHG | DIASTOLIC BLOOD PRESSURE: 78 MMHG | WEIGHT: 168.5 LBS

## 2018-08-22 DIAGNOSIS — I10 BENIGN ESSENTIAL HTN: ICD-10-CM

## 2018-08-22 DIAGNOSIS — E11.8 TYPE 2 DIABETES MELLITUS WITH COMPLICATION, WITHOUT LONG-TERM CURRENT USE OF INSULIN: Primary | ICD-10-CM

## 2018-08-22 PROCEDURE — 99213 OFFICE O/P EST LOW 20 MIN: CPT | Mod: S$GLB,,, | Performed by: NURSE PRACTITIONER

## 2018-08-22 PROCEDURE — 99999 PR PBB SHADOW E&M-EST. PATIENT-LVL V: CPT | Mod: PBBFAC,,, | Performed by: NURSE PRACTITIONER

## 2018-08-22 RX ORDER — GLIMEPIRIDE 1 MG/1
1 TABLET ORAL
Qty: 30 TABLET | Refills: 3 | Status: SHIPPED | OUTPATIENT
Start: 2018-08-22 | End: 2018-12-18 | Stop reason: SDUPTHER

## 2018-08-22 NOTE — PROGRESS NOTES
Subjective:       Patient ID: Milady Bright is a 73 y.o. female.    Chief Complaint: Diabetes    HPI new to me--Pt is a 73 y.o. wf  with a diagnosis of Type 2 diabetes mellitus diagnosed approximately 20110, as well as chronic conditions pending review including HTN, HLP.  Other pertinent medical and social information noted includes, but not limited to: NA.   Pt initially treated with Toujeo 35 units--but found she was hungry all the time.  Primary started her on Trulicity which has worked well--but cost prohibitive.  Thinks she was on metformin in the past and possibly with some diarrhea.  May have been on Januvia and or glimipiride. Not sure if and when--or why agent was changed.  Overall, glucoses quite reasonable--but the Trulicity will expedite pt into the pharmacy gap.  No  Focal complaints.     Interim Events:  No acute events.  Opted to stop Trulicity and add januvia--as less impact on finances.  But now in gap anyway  And a1c is increased. C/o bilateral ankle pain after different  Bilateral injuries.                    Review of Systems   Constitutional: Negative for activity change and fatigue.   HENT: Negative for hearing loss and trouble swallowing.    Eyes: Negative for photophobia and visual disturbance.        Last Eye Exam   Respiratory: Negative for cough and shortness of breath.    Cardiovascular: Negative for chest pain and palpitations.   Gastrointestinal: Positive for diarrhea (seems improved. ). Negative for constipation.   Genitourinary: Negative for frequency and urgency.   Musculoskeletal: Positive for arthralgias (ankles). Negative for myalgias.   Skin: Negative for rash and wound.   Allergic/Immunologic: Negative for food allergies.   Neurological: Negative for weakness and numbness.   Psychiatric/Behavioral: Negative for sleep disturbance. The patient is not nervous/anxious.        Objective:      Physical Exam   Constitutional: She is oriented to person, place, and time. She appears  well-developed and well-nourished.   Appears younger than age, well groomed.    HENT:   Head: Normocephalic and atraumatic.   Nose: Nose normal.   Mouth/Throat: Oropharynx is clear and moist.   Eyes: Conjunctivae and EOM are normal. Pupils are equal, round, and reactive to light.   Neck: Normal range of motion. Neck supple. No tracheal deviation present. No thyromegaly present.   Cardiovascular: Normal rate, regular rhythm, normal heart sounds and intact distal pulses.   Pulmonary/Chest: Effort normal and breath sounds normal.   Musculoskeletal: Normal range of motion. She exhibits no deformity.   Feet: No open wounds or calluses.  Bunions, toe deformities,.  Treated with inter toe pads. Good pedal care.     Ankles--trace swelling Left.     Neurological: She is alert and oriented to person, place, and time.   Skin: Skin is warm and dry.   Psychiatric: She has a normal mood and affect. Her behavior is normal. Judgment and thought content normal.         Hemoglobin A1C   Date Value Ref Range Status   08/03/2018 7.8 (H) 4.0 - 5.6 % Final     Comment:     ADA Screening Guidelines:  5.7-6.4%  Consistent with prediabetes  >or=6.5%  Consistent with diabetes  High levels of fetal hemoglobin interfere with the HbA1C  assay. Heterozygous hemoglobin variants (HbS, HgC, etc)do  not significantly interfere with this assay.   However, presence of multiple variants may affect accuracy.     04/04/2018 7.2 (H) 4.0 - 5.6 % Final     Comment:     According to ADA guidelines, hemoglobin A1c <7.0% represents  optimal control in non-pregnant diabetic patients. Different  metrics may apply to specific patient populations.   Standards of Medical Care in Diabetes-2016.  For the purpose of screening for the presence of diabetes:  <5.7%     Consistent with the absence of diabetes  5.7-6.4%  Consistent with increasing risk for diabetes   (prediabetes)  >or=6.5%  Consistent with diabetes  Currently, no consensus exists for use of hemoglobin  A1c  for diagnosis of diabetes for children.  This Hemoglobin A1c assay has significant interference with fetal   hemoglobin   (HbF). The results are invalid for patients with abnormal amounts of   HbF,   including those with known Hereditary Persistence   of Fetal Hemoglobin. Heterozygous hemoglobin variants (HbAS, HbAC,   HbAD, HbAE, HbA2) do not significantly interfere with this assay;   however, presence of multiple variants in a sample may impact the %   interference.     12/01/2017 10.6 (H) 0.0 - 5.6 % Final     Comment:     Reference Interval:  5.0 - 5.6 Normal   5.7 - 6.4 High Risk   > 6.5 Diabetic    Hgb A1c results are standardized based on the (NGSP) National   Glycohemoglobin Standardization Program.    Hemoglobin A1C levels are related to mean serum/plasma glucose   during the preceding 2-3 months.            Chemistry        Component Value Date/Time     06/16/2018 0805     08/05/2015 0840    K 4.4 06/16/2018 0805    K 4.1 08/05/2015 0840    CL 99 06/16/2018 0805    CL 99 08/05/2015 0840    CO2 28 06/16/2018 0805    BUN 15 06/16/2018 0805    CREATININE 0.62 06/16/2018 0805    CREATININE 0.64 08/05/2015 0840     (H) 06/16/2018 0805        Component Value Date/Time    CALCIUM 9.8 06/16/2018 0805    ALKPHOS 85 06/16/2018 0805    AST 17 06/16/2018 0805    AST 27 02/12/2016 0728    ALT 28 06/16/2018 0805    BILITOT 0.6 06/16/2018 0805    ESTGFRAFRICA >60 06/16/2018 0805    EGFRNONAA >60 06/16/2018 0805        Lab Results   Component Value Date    LDLCALC 108.0 06/16/2018     Lab Results   Component Value Date    TSH 1.580 02/14/2017       Assessment:     1. Type 2 diabetes mellitus with complication, without long-term current use of insulin  Hemoglobin A1c  Chronic-worsedn see plan    2. Benign essential HTN  -chronic-stable-no changes.        Plan:    Increase Januvia to 100 mg a day.   Pt would like to resume Trulicity as she had better control--if she qualifies for assistance.   Will  add 1 mg glimipiride in interim.    Continue  metformin-- twice a day.     ORDERS 08/22/2018    4 mo with a1c priror

## 2018-08-23 ENCOUNTER — TELEPHONE (OUTPATIENT)
Dept: PHARMACY | Facility: CLINIC | Age: 73
End: 2018-08-23

## 2018-10-27 PROBLEM — D47.3 ESSENTIAL THROMBOCYTOSIS: Status: ACTIVE | Noted: 2018-10-27

## 2018-10-28 DIAGNOSIS — E11.9 TYPE 2 DIABETES MELLITUS WITHOUT COMPLICATION, WITHOUT LONG-TERM CURRENT USE OF INSULIN: ICD-10-CM

## 2018-10-29 RX ORDER — METFORMIN HYDROCHLORIDE 500 MG/1
500 TABLET ORAL 2 TIMES DAILY WITH MEALS
Qty: 60 TABLET | Refills: 6 | Status: SHIPPED | OUTPATIENT
Start: 2018-10-29 | End: 2018-12-18 | Stop reason: ALTCHOICE

## 2018-11-05 ENCOUNTER — PATIENT MESSAGE (OUTPATIENT)
Dept: ENDOCRINOLOGY | Facility: CLINIC | Age: 73
End: 2018-11-05

## 2018-12-11 ENCOUNTER — LAB VISIT (OUTPATIENT)
Dept: LAB | Facility: HOSPITAL | Age: 73
End: 2018-12-11
Attending: NURSE PRACTITIONER
Payer: MEDICARE

## 2018-12-11 DIAGNOSIS — E11.8 TYPE 2 DIABETES MELLITUS WITH COMPLICATION, WITHOUT LONG-TERM CURRENT USE OF INSULIN: ICD-10-CM

## 2018-12-11 LAB
ESTIMATED AVG GLUCOSE: 146 MG/DL
HBA1C MFR BLD HPLC: 6.7 %

## 2018-12-11 PROCEDURE — 36415 COLL VENOUS BLD VENIPUNCTURE: CPT | Mod: PO

## 2018-12-11 PROCEDURE — 83036 HEMOGLOBIN GLYCOSYLATED A1C: CPT

## 2018-12-18 ENCOUNTER — OFFICE VISIT (OUTPATIENT)
Dept: ENDOCRINOLOGY | Facility: CLINIC | Age: 73
End: 2018-12-18
Payer: MEDICARE

## 2018-12-18 VITALS
HEIGHT: 67 IN | DIASTOLIC BLOOD PRESSURE: 78 MMHG | BODY MASS INDEX: 27.11 KG/M2 | SYSTOLIC BLOOD PRESSURE: 142 MMHG | HEART RATE: 72 BPM | WEIGHT: 172.75 LBS

## 2018-12-18 DIAGNOSIS — I10 BENIGN ESSENTIAL HTN: ICD-10-CM

## 2018-12-18 DIAGNOSIS — E11.8 TYPE 2 DIABETES MELLITUS WITH COMPLICATION, WITHOUT LONG-TERM CURRENT USE OF INSULIN: Primary | ICD-10-CM

## 2018-12-18 PROCEDURE — 99999 PR PBB SHADOW E&M-EST. PATIENT-LVL III: CPT | Mod: PBBFAC,,, | Performed by: NURSE PRACTITIONER

## 2018-12-18 PROCEDURE — 99213 OFFICE O/P EST LOW 20 MIN: CPT | Mod: S$GLB,,, | Performed by: NURSE PRACTITIONER

## 2018-12-18 RX ORDER — METFORMIN HYDROCHLORIDE 500 MG/1
500 TABLET, EXTENDED RELEASE ORAL
Qty: 30 TABLET | Refills: 3 | Status: SHIPPED | OUTPATIENT
Start: 2018-12-18 | End: 2019-03-15 | Stop reason: SDUPTHER

## 2018-12-18 RX ORDER — GLIMEPIRIDE 1 MG/1
1 TABLET ORAL
Qty: 90 TABLET | Refills: 3 | Status: SHIPPED | OUTPATIENT
Start: 2018-12-18 | End: 2019-12-16 | Stop reason: SDUPTHER

## 2018-12-18 NOTE — PROGRESS NOTES
Subjective:       Patient ID: Milady Bright is a 73 y.o. female.    Chief Complaint: Diabetes    HPI: -Pt is a 73 y.o. wf  with a diagnosis of Type 2 diabetes mellitus diagnosed approximately 20110, as well as chronic conditions pending review including HTN, HLP.  Other pertinent medical and social information noted includes, but not limited to: NA.   Pt initially treated with Toujeo 35 units--but found she was hungry all the time.  Primary started her on Trulicity which has worked well--but cost prohibitive.  Thinks she was on metformin in the past and possibly with some diarrhea.  May have been on Januvia and or glimipiride. Not sure if and when--or why agent was changed.  Overall, glucoses quite reasonable--but the Trulicity will expedite pt into the pharmacy gap.  No  Focal complaints.     Interim Events:  No acute events.  Unable to tolerate even 500 mg  Metformin r/t diarrhea. Continues with  januvia-100 mg and glimipiride 1 mg.  States glucoses are trickling back up over the last couple of weeks.  No c/o hypoglycemia.       For last 2 weeks  -170      Review of Systems   Constitutional: Negative for activity change and fatigue.   HENT: Negative for hearing loss and trouble swallowing.    Eyes: Negative for photophobia and visual disturbance.        Last Eye Exam   Respiratory: Negative for cough and shortness of breath.    Cardiovascular: Negative for chest pain and palpitations.   Gastrointestinal: Positive for diarrhea (seems improved. ). Negative for constipation.   Genitourinary: Negative for frequency and urgency.   Musculoskeletal: Positive for arthralgias (ankles). Negative for myalgias.   Skin: Negative for rash and wound.   Allergic/Immunologic: Negative for food allergies.   Neurological: Negative for weakness and numbness.   Psychiatric/Behavioral: Negative for sleep disturbance. The patient is not nervous/anxious.        Objective:      Physical Exam   Constitutional: She is oriented to  person, place, and time. She appears well-developed and well-nourished.   Appears younger than age, well groomed.    HENT:   Head: Normocephalic and atraumatic.   Nose: Nose normal.   Mouth/Throat: Oropharynx is clear and moist.   Eyes: Conjunctivae and EOM are normal. Pupils are equal, round, and reactive to light.   Neck: Normal range of motion. Neck supple. No tracheal deviation present. No thyromegaly present.   Cardiovascular: Normal rate, regular rhythm, normal heart sounds and intact distal pulses.   Pulmonary/Chest: Effort normal and breath sounds normal.   Musculoskeletal: Normal range of motion. She exhibits no deformity.   Deferred.     Feet: No open wounds or calluses.  Bunions, toe deformities,.  Treated with inter toe pads. Good pedal care.      Neurological: She is alert and oriented to person, place, and time.   Skin: Skin is warm and dry.   Psychiatric: She has a normal mood and affect. Her behavior is normal. Judgment and thought content normal.         Hemoglobin A1C   Date Value Ref Range Status   12/11/2018 6.7 (H) 4.0 - 5.6 % Final     Comment:     ADA Screening Guidelines:  5.7-6.4%  Consistent with prediabetes  >or=6.5%  Consistent with diabetes  High levels of fetal hemoglobin interfere with the HbA1C  assay. Heterozygous hemoglobin variants (HbS, HgC, etc)do  not significantly interfere with this assay.   However, presence of multiple variants may affect accuracy.     10/27/2018 7.2 (H) 0.0 - 5.6 % Final     Comment:     Reference Interval:  5.0 - 5.6 Normal   5.7 - 6.4 High Risk   > 6.5 Diabetic    Hgb A1c results are standardized based on the (NGSP) National   Glycohemoglobin Standardization Program.    Hemoglobin A1C levels are related to mean serum/plasma glucose   during the preceding 2-3 months.        08/03/2018 7.8 (H) 4.0 - 5.6 % Final     Comment:     ADA Screening Guidelines:  5.7-6.4%  Consistent with prediabetes  >or=6.5%  Consistent with diabetes  High levels of fetal  hemoglobin interfere with the HbA1C  assay. Heterozygous hemoglobin variants (HbS, HgC, etc)do  not significantly interfere with this assay.   However, presence of multiple variants may affect accuracy.         Chemistry        Component Value Date/Time     10/27/2018 0744     08/05/2015 0840    K 4.5 10/27/2018 0744    K 4.1 08/05/2015 0840     10/27/2018 0744    CL 99 08/05/2015 0840    CO2 30 10/27/2018 0744    BUN 17 10/27/2018 0744    CREATININE 0.70 10/27/2018 0744    CREATININE 0.64 08/05/2015 0840     (H) 10/27/2018 0744        Component Value Date/Time    CALCIUM 10.0 10/27/2018 0744    ALKPHOS 86 10/27/2018 0744    AST 26 10/27/2018 0744    AST 27 02/12/2016 0728    ALT 28 10/27/2018 0744    BILITOT 0.5 10/27/2018 0744    ESTGFRAFRICA >60 10/27/2018 0744    EGFRNONAA >60 10/27/2018 0744        Lab Results   Component Value Date    LDLCALC 110.6 10/27/2018     Lab Results   Component Value Date    TSH 1.580 02/14/2017       Assessment:     1. Type 2 diabetes mellitus with complication, without long-term current use of insulin  Hemoglobin A1c  Chronic-worsened per pt report.  see plan    2. Benign essential HTN  -chronic-stable-no changes.        Plan:   Continue  Januvia to 100 mg a day.   Continue  1 mg glimipiride in interim.    Restart   metformin- but ER on only 500 mg qd.  -    ORDERS 12/18/2018    3  mo with a1c priror

## 2019-03-15 ENCOUNTER — LAB VISIT (OUTPATIENT)
Dept: LAB | Facility: HOSPITAL | Age: 74
End: 2019-03-15
Attending: NURSE PRACTITIONER
Payer: MEDICARE

## 2019-03-15 DIAGNOSIS — E11.8 TYPE 2 DIABETES MELLITUS WITH COMPLICATION, WITHOUT LONG-TERM CURRENT USE OF INSULIN: ICD-10-CM

## 2019-03-15 LAB
ESTIMATED AVG GLUCOSE: 177 MG/DL
HBA1C MFR BLD HPLC: 7.8 %

## 2019-03-15 PROCEDURE — 36415 COLL VENOUS BLD VENIPUNCTURE: CPT | Mod: PO

## 2019-03-15 PROCEDURE — 83036 HEMOGLOBIN GLYCOSYLATED A1C: CPT

## 2019-03-15 RX ORDER — METFORMIN HYDROCHLORIDE 500 MG/1
500 TABLET, EXTENDED RELEASE ORAL
Qty: 30 TABLET | Refills: 11 | Status: CANCELLED | OUTPATIENT
Start: 2019-03-15 | End: 2020-03-14

## 2019-03-15 RX ORDER — METFORMIN HYDROCHLORIDE 500 MG/1
500 TABLET, EXTENDED RELEASE ORAL
Qty: 30 TABLET | Refills: 11 | Status: SHIPPED | OUTPATIENT
Start: 2019-03-15 | End: 2020-02-18

## 2019-03-22 ENCOUNTER — OFFICE VISIT (OUTPATIENT)
Dept: ENDOCRINOLOGY | Facility: CLINIC | Age: 74
End: 2019-03-22
Payer: MEDICARE

## 2019-03-22 VITALS
DIASTOLIC BLOOD PRESSURE: 68 MMHG | HEIGHT: 67 IN | BODY MASS INDEX: 26.96 KG/M2 | HEART RATE: 77 BPM | SYSTOLIC BLOOD PRESSURE: 132 MMHG | WEIGHT: 171.75 LBS

## 2019-03-22 DIAGNOSIS — I10 BENIGN ESSENTIAL HTN: ICD-10-CM

## 2019-03-22 DIAGNOSIS — E11.8 TYPE 2 DIABETES MELLITUS WITH COMPLICATION, WITHOUT LONG-TERM CURRENT USE OF INSULIN: Primary | ICD-10-CM

## 2019-03-22 PROCEDURE — 99999 PR PBB SHADOW E&M-EST. PATIENT-LVL IV: ICD-10-PCS | Mod: PBBFAC,,, | Performed by: NURSE PRACTITIONER

## 2019-03-22 PROCEDURE — 99213 PR OFFICE/OUTPT VISIT, EST, LEVL III, 20-29 MIN: ICD-10-PCS | Mod: S$GLB,,, | Performed by: NURSE PRACTITIONER

## 2019-03-22 PROCEDURE — 99213 OFFICE O/P EST LOW 20 MIN: CPT | Mod: S$GLB,,, | Performed by: NURSE PRACTITIONER

## 2019-03-22 PROCEDURE — 99999 PR PBB SHADOW E&M-EST. PATIENT-LVL IV: CPT | Mod: PBBFAC,,, | Performed by: NURSE PRACTITIONER

## 2019-03-22 NOTE — PROGRESS NOTES
Subjective:       Patient ID: Milady Bright is a 74 y.o. female.    Chief Complaint: Diabetes    HPI: -Pt is a 74 y.o. wf  with a diagnosis of Type 2 diabetes mellitus diagnosed approximately 20110, as well as chronic conditions pending review including HTN, HLP.  Other pertinent medical and social information noted includes, but not limited to: NA.   Pt initially treated with Toujeo 35 units--but found she was hungry all the time.  Primary started her on Trulicity which has worked well--but cost prohibitive.  Thinks she was on metformin in the past and possibly with some diarrhea.  May have been on Januvia and or glimipiride. Not sure if and when--or why agent was changed.  Overall, glucoses quite reasonable--but the Trulicity will expedite pt into the pharmacy gap.  No  Focal complaints.     Interim Events:  No acute events.  Intermittent diarrhea issues have continued so pt has not tried to reintroduce metformin XR.  A1C increased significantly.  Continues with  januvia-100 mg and glimipiride 1 mg.   No c/o hypoglycemia.     Checks glucoses fasting QOD--120's      Review of Systems   Constitutional: Positive for activity change. Negative for fatigue.   HENT: Negative for hearing loss and trouble swallowing.    Eyes: Negative for photophobia and visual disturbance.        Last Eye Exam   Respiratory: Negative for cough and shortness of breath.    Cardiovascular: Negative for chest pain and palpitations.   Gastrointestinal: Positive for diarrhea (this continues to be intermittent--as a result has not restarted metformin, ). Negative for constipation.   Genitourinary: Negative for frequency and urgency.   Musculoskeletal: Positive for arthralgias (ankles). Negative for myalgias.   Skin: Negative for rash and wound.   Allergic/Immunologic: Negative for food allergies.   Neurological: Negative for weakness and numbness.   Psychiatric/Behavioral: Negative for sleep disturbance. The patient is not nervous/anxious.         Objective:      Physical Exam   Constitutional: She is oriented to person, place, and time. She appears well-developed and well-nourished.   Appears younger than age, well groomed.    HENT:   Head: Normocephalic and atraumatic.   Nose: Nose normal.   Mouth/Throat: Oropharynx is clear and moist.   Eyes: Conjunctivae and EOM are normal. Pupils are equal, round, and reactive to light.   Neck: Normal range of motion. Neck supple. No tracheal deviation present. No thyromegaly present.   Cardiovascular: Normal rate, regular rhythm, normal heart sounds and intact distal pulses.   Pulmonary/Chest: Effort normal and breath sounds normal.   Musculoskeletal: Normal range of motion. She exhibits no deformity.   Deferred.     Feet: No open wounds or calluses.  Bunions, toe deformities,.  Treated with inter toe pads. Good pedal care.      Neurological: She is alert and oriented to person, place, and time.   Skin: Skin is warm and dry.   Psychiatric: She has a normal mood and affect. Her behavior is normal. Judgment and thought content normal.         Hemoglobin A1C   Date Value Ref Range Status   03/15/2019 7.8 (H) 4.0 - 5.6 % Final     Comment:     ADA Screening Guidelines:  5.7-6.4%  Consistent with prediabetes  >or=6.5%  Consistent with diabetes  High levels of fetal hemoglobin interfere with the HbA1C  assay. Heterozygous hemoglobin variants (HbS, HgC, etc)do  not significantly interfere with this assay.   However, presence of multiple variants may affect accuracy.     12/11/2018 6.7 (H) 4.0 - 5.6 % Final     Comment:     ADA Screening Guidelines:  5.7-6.4%  Consistent with prediabetes  >or=6.5%  Consistent with diabetes  High levels of fetal hemoglobin interfere with the HbA1C  assay. Heterozygous hemoglobin variants (HbS, HgC, etc)do  not significantly interfere with this assay.   However, presence of multiple variants may affect accuracy.     10/27/2018 7.2 (H) 0.0 - 5.6 % Final     Comment:     Reference Interval:  5.0  - 5.6 Normal   5.7 - 6.4 High Risk   > 6.5 Diabetic    Hgb A1c results are standardized based on the (NGSP) National   Glycohemoglobin Standardization Program.    Hemoglobin A1C levels are related to mean serum/plasma glucose   during the preceding 2-3 months.            Chemistry        Component Value Date/Time     10/27/2018 0744     08/05/2015 0840    K 4.5 10/27/2018 0744    K 4.1 08/05/2015 0840     10/27/2018 0744    CL 99 08/05/2015 0840    CO2 30 10/27/2018 0744    BUN 17 10/27/2018 0744    CREATININE 0.70 10/27/2018 0744    CREATININE 0.64 08/05/2015 0840     (H) 10/27/2018 0744        Component Value Date/Time    CALCIUM 10.0 10/27/2018 0744    ALKPHOS 86 10/27/2018 0744    AST 26 10/27/2018 0744    AST 27 02/12/2016 0728    ALT 28 10/27/2018 0744    BILITOT 0.5 10/27/2018 0744    ESTGFRAFRICA >60 10/27/2018 0744    EGFRNONAA >60 10/27/2018 0744        Lab Results   Component Value Date    LDLCALC 110.6 10/27/2018     Lab Results   Component Value Date    TSH 1.580 02/14/2017       Assessment:     1. Type 2 diabetes mellitus with complication, without long-term current use of insulin  Hemoglobin A1c  Chronic-worsened per pt report.  see plan    2. Benign essential HTN  -chronic-stable-no changes.        Plan:   Continue  Januvia to 100 mg a day.   Continue  1 mg glimipiride in interim.      Recommend increase SBGM to BID and alternate.  Better identify patterns.  If a diet issue, pt can correct, if she feels not really diet, can notify me and we can further address with either education or rx intervention.     ORDERS 03/22/2019  4 mo with a1c prior

## 2019-05-02 PROBLEM — M20.5X2 CROSSOVER TOE DEFORMITY OF LEFT FOOT: Status: ACTIVE | Noted: 2019-05-02

## 2019-05-02 PROBLEM — M20.5X1: Status: ACTIVE | Noted: 2019-05-02

## 2019-05-02 PROBLEM — E78.00 HYPERCHOLESTEROLEMIA: Status: ACTIVE | Noted: 2019-05-02

## 2019-05-02 PROBLEM — E11.42 TYPE 2 DIABETES MELLITUS WITH DIABETIC POLYNEUROPATHY, WITHOUT LONG-TERM CURRENT USE OF INSULIN: Status: ACTIVE | Noted: 2019-05-02

## 2019-05-02 PROBLEM — I70.0 TYPE 2 DIABETES MELLITUS WITH ATHEROSCLEROSIS OF AORTA: Status: ACTIVE | Noted: 2019-05-02

## 2019-05-02 PROBLEM — E11.51 TYPE 2 DIABETES MELLITUS WITH ATHEROSCLEROSIS OF AORTA: Status: ACTIVE | Noted: 2019-05-02

## 2019-05-02 PROBLEM — K52.9 CHRONIC DIARRHEA: Status: ACTIVE | Noted: 2019-05-02

## 2019-05-02 PROBLEM — L84 CALLUS OF FOOT: Status: ACTIVE | Noted: 2019-05-02

## 2019-05-02 PROBLEM — Z79.82 ASPIRIN LONG-TERM USE: Status: ACTIVE | Noted: 2019-05-02

## 2019-07-16 ENCOUNTER — LAB VISIT (OUTPATIENT)
Dept: LAB | Facility: HOSPITAL | Age: 74
End: 2019-07-16
Attending: NURSE PRACTITIONER
Payer: MEDICARE

## 2019-07-16 DIAGNOSIS — E11.8 TYPE 2 DIABETES MELLITUS WITH COMPLICATION, WITHOUT LONG-TERM CURRENT USE OF INSULIN: ICD-10-CM

## 2019-07-16 LAB
ESTIMATED AVG GLUCOSE: 171 MG/DL (ref 68–131)
HBA1C MFR BLD HPLC: 7.6 % (ref 4–5.6)

## 2019-07-16 PROCEDURE — 36415 COLL VENOUS BLD VENIPUNCTURE: CPT | Mod: PO

## 2019-07-16 PROCEDURE — 83036 HEMOGLOBIN GLYCOSYLATED A1C: CPT

## 2019-07-23 ENCOUNTER — OFFICE VISIT (OUTPATIENT)
Dept: ENDOCRINOLOGY | Facility: CLINIC | Age: 74
End: 2019-07-23
Payer: MEDICARE

## 2019-07-23 VITALS
HEIGHT: 67 IN | WEIGHT: 168.44 LBS | BODY MASS INDEX: 26.44 KG/M2 | HEART RATE: 81 BPM | SYSTOLIC BLOOD PRESSURE: 128 MMHG | DIASTOLIC BLOOD PRESSURE: 66 MMHG

## 2019-07-23 DIAGNOSIS — I10 BENIGN ESSENTIAL HTN: ICD-10-CM

## 2019-07-23 DIAGNOSIS — E11.42 TYPE 2 DIABETES MELLITUS WITH DIABETIC POLYNEUROPATHY, WITHOUT LONG-TERM CURRENT USE OF INSULIN: Primary | ICD-10-CM

## 2019-07-23 DIAGNOSIS — E78.00 HYPERCHOLESTEROLEMIA: ICD-10-CM

## 2019-07-23 PROCEDURE — 99999 PR PBB SHADOW E&M-EST. PATIENT-LVL IV: CPT | Mod: PBBFAC,,, | Performed by: NURSE PRACTITIONER

## 2019-07-23 PROCEDURE — 99213 PR OFFICE/OUTPT VISIT, EST, LEVL III, 20-29 MIN: ICD-10-PCS | Mod: S$GLB,,, | Performed by: NURSE PRACTITIONER

## 2019-07-23 PROCEDURE — 99999 PR PBB SHADOW E&M-EST. PATIENT-LVL IV: ICD-10-PCS | Mod: PBBFAC,,, | Performed by: NURSE PRACTITIONER

## 2019-07-23 PROCEDURE — 99213 OFFICE O/P EST LOW 20 MIN: CPT | Mod: S$GLB,,, | Performed by: NURSE PRACTITIONER

## 2019-07-23 RX ORDER — INSULIN PUMP SYRINGE, 3 ML
EACH MISCELLANEOUS
Qty: 1 EACH | Refills: 0 | Status: SHIPPED | OUTPATIENT
Start: 2019-07-23 | End: 2022-09-28

## 2019-07-23 RX ORDER — LANCETS
EACH MISCELLANEOUS
Qty: 50 EACH | Refills: 12 | Status: SHIPPED | OUTPATIENT
Start: 2019-07-23 | End: 2023-11-08 | Stop reason: CLARIF

## 2019-07-23 NOTE — PROGRESS NOTES
Subjective:       Patient ID: Milady Bright is a 74 y.o. female.    Chief Complaint: routine f/u Type 2 Dm .      HPI: -Pt is a 74 y.o. wf  with a diagnosis of Type 2 diabetes mellitus diagnosed approximately 20110, as well as chronic conditions pending review including HTN, HLP.  Other pertinent medical and social information noted includes, but not limited to: NA.   Pt initially treated with Toujeo 35 units--but found she was hungry all the time.  Primary started her on Trulicity which has worked well--but cost prohibitive.  Thinks she was on metformin in the past and possibly with some diarrhea.  May have been on Januvia and or glimipiride. Not sure if and when--or why agent was changed.  Overall, glucoses quite reasonable--but the Trulicity will expedite pt into the pharmacy gap.  No  Focal complaints.     Interim Events:  No acute events.  Intermittent diarrhea issues have continued so pt has not tried to reintroduce metformin XR. Cholestyramine helpful, but too expensive. Pt is now added fiber, and that was helping.   .  Continues with  januvia-100 mg and glimipiride 1 mg.   No c/o hypoglycemia. However, hitting pharmacy gap so will suhasley be unable to continue Januvia.  Also not checking glucoses because pharmacy said strips were $100.     Checks glucoses fasting QOD--120's      Diabetes Flow Sheet:  Diabetes Medications: Januvia 100 mg,  glimipiride 1 mg.       If insulin vial or pen preference:na  Prior failed/or not tolerated medication therapies:metformin-diarrhea, Trulicity-cost, Toujeo--pt c/o being hungry all the time.   Diabetes Complications:   Aspirin: 81 mg   Statin: pt refuses   ACE/ARB: irbesartan 300 mg   Last Urine Microalbumin:    Last Eye exam:annuall   Last Diabetic Education:    Glucometer:none     Review of Systems   Constitutional: Positive for activity change. Negative for fatigue.   HENT: Negative for hearing loss and trouble swallowing.    Eyes: Negative for photophobia and visual  disturbance.        Last Eye Exam: UTD.    Respiratory: Negative for cough and shortness of breath.    Cardiovascular: Negative for chest pain and palpitations.   Gastrointestinal: Positive for diarrhea (this continues to be intermittent--as a result has not restarted metformin, ). Negative for constipation.   Genitourinary: Negative for frequency and urgency.   Musculoskeletal: Positive for arthralgias (ankles). Negative for myalgias.   Skin: Negative for rash and wound.   Allergic/Immunologic: Negative for food allergies.   Neurological: Negative for weakness and numbness.   Psychiatric/Behavioral: Negative for sleep disturbance. The patient is not nervous/anxious.        Objective:      Physical Exam   Constitutional: She is oriented to person, place, and time. She appears well-developed and well-nourished.   Appears younger than age, well groomed.    HENT:   Head: Normocephalic and atraumatic.   Nose: Nose normal.   Mouth/Throat: Oropharynx is clear and moist.   Eyes: Pupils are equal, round, and reactive to light. Conjunctivae and EOM are normal.   Neck: Normal range of motion. Neck supple. No tracheal deviation present. No thyromegaly present.   Cardiovascular: Normal rate, regular rhythm, normal heart sounds and intact distal pulses.   Pulmonary/Chest: Effort normal and breath sounds normal.   Musculoskeletal: Normal range of motion. She exhibits no deformity.   Deferred.     Feet: No open wounds or calluses.  Bunions, toe deformities,.  Treated with inter toe pads. Good pedal care.      Neurological: She is alert and oriented to person, place, and time.   Skin: Skin is warm and dry.   Psychiatric: She has a normal mood and affect. Her behavior is normal. Judgment and thought content normal.         Hemoglobin A1C   Date Value Ref Range Status   07/16/2019 7.6 (H) 4.0 - 5.6 % Final     Comment:     ADA Screening Guidelines:  5.7-6.4%  Consistent with prediabetes  >or=6.5%  Consistent with diabetes  High  levels of fetal hemoglobin interfere with the HbA1C  assay. Heterozygous hemoglobin variants (HbS, HgC, etc)do  not significantly interfere with this assay.   However, presence of multiple variants may affect accuracy.     03/15/2019 7.8 (H) 4.0 - 5.6 % Final     Comment:     ADA Screening Guidelines:  5.7-6.4%  Consistent with prediabetes  >or=6.5%  Consistent with diabetes  High levels of fetal hemoglobin interfere with the HbA1C  assay. Heterozygous hemoglobin variants (HbS, HgC, etc)do  not significantly interfere with this assay.   However, presence of multiple variants may affect accuracy.     12/11/2018 6.7 (H) 4.0 - 5.6 % Final     Comment:     ADA Screening Guidelines:  5.7-6.4%  Consistent with prediabetes  >or=6.5%  Consistent with diabetes  High levels of fetal hemoglobin interfere with the HbA1C  assay. Heterozygous hemoglobin variants (HbS, HgC, etc)do  not significantly interfere with this assay.   However, presence of multiple variants may affect accuracy.         Chemistry        Component Value Date/Time     04/24/2019 0750     08/05/2015 0840    K 4.8 04/24/2019 0750    K 4.1 08/05/2015 0840    CL 99 04/24/2019 0750    CL 99 08/05/2015 0840    CO2 30 04/24/2019 0750    BUN 19 (H) 04/24/2019 0750    CREATININE 0.84 04/24/2019 0750    CREATININE 0.64 08/05/2015 0840     (H) 04/24/2019 0750        Component Value Date/Time    CALCIUM 9.9 04/24/2019 0750    ALKPHOS 79 04/24/2019 0750    AST 25 04/24/2019 0750    AST 27 02/12/2016 0728    ALT 27 04/24/2019 0750    BILITOT 0.7 04/24/2019 0750    ESTGFRAFRICA >60 04/24/2019 0750    EGFRNONAA >60 04/24/2019 0750        Lab Results   Component Value Date    LDLCALC 104.2 04/24/2019     Lab Results   Component Value Date    TSH 2.300 04/24/2019       Assessment:     1. Type 2 diabetes mellitus with complication, without long-term current use of insulin  Hemoglobin A1c  Chronic-stable   see plan    2. Benign essential HTN   -chronic-stable-no changes.        Plan:   Continue  Januvia to 100 mg a day.   Continue  1 mg glimipiride in interim.      Counseled on Medicare benefits per diabetes testing supplies.  Written RX for same to take to pharmacy that takes assignments.   Message sent to see if pt qualifies for Januvia assistance. I    ORDERS 07/23/2019  6  mo with a1c prior

## 2019-12-16 DIAGNOSIS — E11.8 TYPE 2 DIABETES MELLITUS WITH COMPLICATION, WITHOUT LONG-TERM CURRENT USE OF INSULIN: ICD-10-CM

## 2019-12-16 RX ORDER — GLIMEPIRIDE 1 MG/1
1 TABLET ORAL
Qty: 90 TABLET | Refills: 3 | Status: CANCELLED | OUTPATIENT
Start: 2019-12-16 | End: 2020-12-15

## 2019-12-17 RX ORDER — GLIMEPIRIDE 1 MG/1
1 TABLET ORAL
Qty: 90 TABLET | Refills: 3 | Status: SHIPPED | OUTPATIENT
Start: 2019-12-17 | End: 2021-01-20 | Stop reason: SDUPTHER

## 2020-01-07 ENCOUNTER — LAB VISIT (OUTPATIENT)
Dept: LAB | Facility: HOSPITAL | Age: 75
End: 2020-01-07
Attending: NURSE PRACTITIONER
Payer: MEDICARE

## 2020-01-07 DIAGNOSIS — E11.42 TYPE 2 DIABETES MELLITUS WITH DIABETIC POLYNEUROPATHY, WITHOUT LONG-TERM CURRENT USE OF INSULIN: ICD-10-CM

## 2020-01-07 LAB
ESTIMATED AVG GLUCOSE: 192 MG/DL (ref 68–131)
HBA1C MFR BLD HPLC: 8.3 % (ref 4–5.6)

## 2020-01-07 PROCEDURE — 83036 HEMOGLOBIN GLYCOSYLATED A1C: CPT

## 2020-01-07 PROCEDURE — 36415 COLL VENOUS BLD VENIPUNCTURE: CPT | Mod: PO

## 2020-01-14 ENCOUNTER — OFFICE VISIT (OUTPATIENT)
Dept: ENDOCRINOLOGY | Facility: CLINIC | Age: 75
End: 2020-01-14
Payer: MEDICARE

## 2020-01-14 VITALS
WEIGHT: 174.25 LBS | DIASTOLIC BLOOD PRESSURE: 72 MMHG | SYSTOLIC BLOOD PRESSURE: 144 MMHG | HEIGHT: 67 IN | BODY MASS INDEX: 27.35 KG/M2 | HEART RATE: 85 BPM

## 2020-01-14 DIAGNOSIS — E78.00 HYPERCHOLESTEROLEMIA: ICD-10-CM

## 2020-01-14 DIAGNOSIS — E11.42 TYPE 2 DIABETES MELLITUS WITH DIABETIC POLYNEUROPATHY, WITHOUT LONG-TERM CURRENT USE OF INSULIN: Primary | ICD-10-CM

## 2020-01-14 DIAGNOSIS — I10 BENIGN ESSENTIAL HTN: ICD-10-CM

## 2020-01-14 PROCEDURE — 99999 PR PBB SHADOW E&M-EST. PATIENT-LVL V: ICD-10-PCS | Mod: PBBFAC,,, | Performed by: NURSE PRACTITIONER

## 2020-01-14 PROCEDURE — 99999 PR PBB SHADOW E&M-EST. PATIENT-LVL V: CPT | Mod: PBBFAC,,, | Performed by: NURSE PRACTITIONER

## 2020-01-14 PROCEDURE — 99214 OFFICE O/P EST MOD 30 MIN: CPT | Mod: S$GLB,,, | Performed by: NURSE PRACTITIONER

## 2020-01-14 PROCEDURE — 99214 PR OFFICE/OUTPT VISIT, EST, LEVL IV, 30-39 MIN: ICD-10-PCS | Mod: S$GLB,,, | Performed by: NURSE PRACTITIONER

## 2020-01-14 NOTE — PROGRESS NOTES
Subjective:       Patient ID: Milady Bright is a 74 y.o. female.    Chief Complaint: routine f/u Type 2 Dm .      HPI: -Pt is a 74 y.o. wf  with a diagnosis of Type 2 diabetes mellitus diagnosed approximately 20110, as well as chronic conditions pending review including HTN, HLP.  Other pertinent medical and social information noted includes, but not limited to: NA.   Pt initially treated with Toujeo 35 units--but found she was hungry all the time.  Primary started her on Trulicity which has worked well--but cost prohibitive.  Thinks she was on metformin in the past and possibly with some diarrhea.  May have been on Januvia and or glimipiride. Not sure if and when--or why agent was changed.  Overall, glucoses quite reasonable--but the Trulicity will expedite pt into the pharmacy gap.  No  Focal complaints.     Interim Events:  No acute events.  States a1c increased as she has gotten off diet. ONly checking glucoses about once a week.  Concerned with memory issues and trying to avoid going to  for the memory work up.  Continues with  januvia-100 mg and glimipiride 1 mg.   No c/o hypoglycemia.     Checks glucoses fasting--120's      Diabetes Flow Sheet:  Diabetes Medications: Januvia 100 mg,  glimipiride 1 mg.       If insulin vial or pen preference:na  Prior failed/or not tolerated medication therapies:metformin-diarrhea, Trulicity-cost, Toujeo--pt c/o being hungry all the time.   Diabetes Complications:   Aspirin: 81 mg   Statin: pt refuses   ACE/ARB: irbesartan 300 mg   Last Urine Microalbumin:    Last Eye exam:annuall   Last Diabetic Education:    Glucometer:none     Review of Systems   Constitutional: Positive for activity change. Negative for fatigue.   HENT: Negative for hearing loss and trouble swallowing.    Eyes: Negative for photophobia and visual disturbance.        Last Eye Exam: UTD.    Respiratory: Negative for cough and shortness of breath.    Cardiovascular: Negative for chest pain and palpitations.    Gastrointestinal: Positive for diarrhea (this continues to be intermittent--as a result has not restarted metformin, --is seeing gastro--added fiber-improved. ). Negative for constipation.   Genitourinary: Negative for frequency and urgency.   Musculoskeletal: Positive for arthralgias (ankles). Negative for myalgias.   Skin: Negative for rash and wound.   Allergic/Immunologic: Negative for food allergies.   Neurological: Negative for weakness and numbness.   Psychiatric/Behavioral: Negative for sleep disturbance. The patient is not nervous/anxious.        Objective:      Physical Exam   Constitutional: She is oriented to person, place, and time. She appears well-developed and well-nourished.   Appears younger than age, well groomed.    HENT:   Head: Normocephalic and atraumatic.   Nose: Nose normal.   Mouth/Throat: Oropharynx is clear and moist.   Eyes: Pupils are equal, round, and reactive to light. Conjunctivae and EOM are normal.   Neck: Normal range of motion. Neck supple. No tracheal deviation present. No thyromegaly present.   Cardiovascular: Normal rate, regular rhythm, normal heart sounds and intact distal pulses.   Pulmonary/Chest: Effort normal and breath sounds normal.   Musculoskeletal: Normal range of motion. She exhibits no deformity.   Deferred.     Feet: No open wounds or calluses.  Bunions, toe deformities,.  Treated with inter toe pads. Good pedal care.      Neurological: She is alert and oriented to person, place, and time.   Skin: Skin is warm and dry.   Psychiatric: She has a normal mood and affect. Her behavior is normal. Judgment and thought content normal.         Hemoglobin A1C   Date Value Ref Range Status   01/07/2020 8.3 (H) 4.0 - 5.6 % Final     Comment:     ADA Screening Guidelines:  5.7-6.4%  Consistent with prediabetes  >or=6.5%  Consistent with diabetes  High levels of fetal hemoglobin interfere with the HbA1C  assay. Heterozygous hemoglobin variants (HbS, HgC, etc)do  not  significantly interfere with this assay.   However, presence of multiple variants may affect accuracy.     07/16/2019 7.6 (H) 4.0 - 5.6 % Final     Comment:     ADA Screening Guidelines:  5.7-6.4%  Consistent with prediabetes  >or=6.5%  Consistent with diabetes  High levels of fetal hemoglobin interfere with the HbA1C  assay. Heterozygous hemoglobin variants (HbS, HgC, etc)do  not significantly interfere with this assay.   However, presence of multiple variants may affect accuracy.     03/15/2019 7.8 (H) 4.0 - 5.6 % Final     Comment:     ADA Screening Guidelines:  5.7-6.4%  Consistent with prediabetes  >or=6.5%  Consistent with diabetes  High levels of fetal hemoglobin interfere with the HbA1C  assay. Heterozygous hemoglobin variants (HbS, HgC, etc)do  not significantly interfere with this assay.   However, presence of multiple variants may affect accuracy.         Chemistry        Component Value Date/Time     04/24/2019 0750     08/05/2015 0840    K 4.8 04/24/2019 0750    K 4.1 08/05/2015 0840    CL 99 04/24/2019 0750    CL 99 08/05/2015 0840    CO2 30 04/24/2019 0750    BUN 19 (H) 04/24/2019 0750    CREATININE 0.84 04/24/2019 0750    CREATININE 0.64 08/05/2015 0840     (H) 04/24/2019 0750        Component Value Date/Time    CALCIUM 9.9 04/24/2019 0750    ALKPHOS 79 04/24/2019 0750    AST 25 04/24/2019 0750    AST 27 02/12/2016 0728    ALT 27 04/24/2019 0750    BILITOT 0.7 04/24/2019 0750    ESTGFRAFRICA >60 04/24/2019 0750    EGFRNONAA >60 04/24/2019 0750        Lab Results   Component Value Date    LDLCALC 104.2 04/24/2019     Lab Results   Component Value Date    TSH 2.300 04/24/2019       Assessment:     1. Type 2 diabetes mellitus with complication, without long-term current use of insulin  Hemoglobin A1c  Chronic-stable   see plan    2. Benign essential HTN  -chronic-stable-no changes.      1. Type 2 diabetes mellitus with diabetic polyneuropathy, without long-term current use of insulin   Comprehensive metabolic panel  Chronic-uncontrolled-get back on diet.     Hemoglobin A1c    Lipid panel    Microalbumin/creatinine urine ratio    Hemoglobin A1c   2. Benign essential HTN  Chronic-stable-cont irbesartan 300 mg    3. Hypercholesterolemia  -chronic-no statin noted. --note pt refused previoulsy        Plan:   Continue  Januvia to 100 mg a day.   Continue  1 mg glimipiride in interim.      Counseled on Medicare benefits per diabetes testing supplies.  Written RX for same to take to pharmacy that takes assignments.   Message sent to see if pt qualifies for Januvia assistance. I    ORDERS 01/14/2020  3  mo with  a1c prior

## 2020-03-05 ENCOUNTER — TELEPHONE (OUTPATIENT)
Dept: NEUROLOGY | Facility: CLINIC | Age: 75
End: 2020-03-05

## 2020-03-05 NOTE — TELEPHONE ENCOUNTER
----- Message from Viviana Field sent at 3/4/2020  1:41 PM CST -----  Pt is requesting a call back to get a appointment to come in and establish care.      Pt is having problem's with Remembering thing's       Please call and advise      Thank you

## 2020-03-06 ENCOUNTER — TELEPHONE (OUTPATIENT)
Dept: NEUROLOGY | Facility: CLINIC | Age: 75
End: 2020-03-06

## 2020-03-06 NOTE — TELEPHONE ENCOUNTER
----- Message from Princess JADYN Keller sent at 3/6/2020  3:29 PM CST -----  Contact: pt  Type: Needs Medical Advice    Who Called Patient  Best Call Back Number:   Additional Information: Patient is calling to schedule a appt with the Provider due to Dr. Montesinos going to a different location.

## 2020-04-03 ENCOUNTER — TELEPHONE (OUTPATIENT)
Dept: NEUROLOGY | Facility: CLINIC | Age: 75
End: 2020-04-03

## 2020-05-23 PROBLEM — K63.5 COLON POLYP: Status: ACTIVE | Noted: 2020-05-23

## 2020-06-12 ENCOUNTER — LAB VISIT (OUTPATIENT)
Dept: LAB | Facility: HOSPITAL | Age: 75
End: 2020-06-12
Attending: INTERNAL MEDICINE
Payer: MEDICARE

## 2020-06-12 DIAGNOSIS — E11.42 TYPE 2 DIABETES MELLITUS WITH DIABETIC POLYNEUROPATHY, WITHOUT LONG-TERM CURRENT USE OF INSULIN: ICD-10-CM

## 2020-06-12 PROCEDURE — 83036 HEMOGLOBIN GLYCOSYLATED A1C: CPT

## 2020-06-12 PROCEDURE — 36415 COLL VENOUS BLD VENIPUNCTURE: CPT | Mod: PO

## 2020-06-13 LAB
ESTIMATED AVG GLUCOSE: 183 MG/DL (ref 68–131)
HBA1C MFR BLD HPLC: 8 % (ref 4–5.6)

## 2020-06-19 ENCOUNTER — OFFICE VISIT (OUTPATIENT)
Dept: ENDOCRINOLOGY | Facility: CLINIC | Age: 75
End: 2020-06-19
Payer: MEDICARE

## 2020-06-19 VITALS
SYSTOLIC BLOOD PRESSURE: 144 MMHG | BODY MASS INDEX: 27.64 KG/M2 | HEART RATE: 81 BPM | HEIGHT: 67 IN | DIASTOLIC BLOOD PRESSURE: 74 MMHG | WEIGHT: 176.13 LBS

## 2020-06-19 DIAGNOSIS — E11.42 TYPE 2 DIABETES MELLITUS WITH DIABETIC POLYNEUROPATHY, WITHOUT LONG-TERM CURRENT USE OF INSULIN: Primary | ICD-10-CM

## 2020-06-19 DIAGNOSIS — E78.00 HYPERCHOLESTEROLEMIA: ICD-10-CM

## 2020-06-19 DIAGNOSIS — L84 CALLUS OF FOOT: ICD-10-CM

## 2020-06-19 DIAGNOSIS — I10 BENIGN ESSENTIAL HTN: ICD-10-CM

## 2020-06-19 DIAGNOSIS — G63 POLYNEUROPATHY ASSOCIATED WITH UNDERLYING DISEASE: ICD-10-CM

## 2020-06-19 PROCEDURE — 99999 PR PBB SHADOW E&M-EST. PATIENT-LVL IV: CPT | Mod: PBBFAC,,, | Performed by: NURSE PRACTITIONER

## 2020-06-19 PROCEDURE — 99214 OFFICE O/P EST MOD 30 MIN: CPT | Mod: S$GLB,,, | Performed by: NURSE PRACTITIONER

## 2020-06-19 PROCEDURE — 99214 PR OFFICE/OUTPT VISIT, EST, LEVL IV, 30-39 MIN: ICD-10-PCS | Mod: S$GLB,,, | Performed by: NURSE PRACTITIONER

## 2020-06-19 PROCEDURE — 3052F HG A1C>EQUAL 8.0%<EQUAL 9.0%: CPT | Mod: CPTII,S$GLB,, | Performed by: NURSE PRACTITIONER

## 2020-06-19 PROCEDURE — 3052F PR MOST RECENT HEMOGLOBIN A1C LEVEL 8.0 - < 9.0%: ICD-10-PCS | Mod: CPTII,S$GLB,, | Performed by: NURSE PRACTITIONER

## 2020-06-19 PROCEDURE — 99999 PR PBB SHADOW E&M-EST. PATIENT-LVL IV: ICD-10-PCS | Mod: PBBFAC,,, | Performed by: NURSE PRACTITIONER

## 2020-06-19 RX ORDER — COLESEVELAM HYDROCHLORIDE 625 MG/1
625 TABLET, FILM COATED ORAL
COMMUNITY
Start: 2020-06-09 | End: 2022-09-28

## 2020-06-19 NOTE — PROGRESS NOTES
Subjective:       Patient ID: Milady Bright is a 75 y.o. female.    Chief Complaint: routine f/u Type 2 Dm .      HPI: -Pt is a 75 y.o. wf  with a diagnosis of Type 2 diabetes mellitus diagnosed approximately 20110, as well as chronic conditions pending review including HTN, HLP.  Other pertinent medical and social information noted includes, but not limited to: NA.   Pt initially treated with Toujeo 35 units--but found she was hungry all the time.  Primary started her on Trulicity which has worked well--but cost prohibitive.  Thinks she was on metformin in the past and possibly with some diarrhea.  May have been on Januvia and or glimipiride. Not sure if and when--or why agent was changed.  Overall, glucoses quite reasonable--but the Trulicity will expedite pt into the pharmacy gap.  No  Focal complaints.     Interim Events:  No acute events. Concerned about finances with Januvia and needs to reapply for med assistance.  Currently treated with new agent for gastro issues ( cholestyramine) ,  Hopefully this will allow reconsideration of use of metformin, which she could not tolerate previoulsy.      Concerned with memory issues and trying to avoid going to  for the memory work up.  Continues with  januvia-100 mg and glimipiride 1 mg.   No c/o hypoglycemia.     Checks glucoses fasting--usually about 140 with occassionaly higher outliers.      Diabetes Flow Sheet:  Diabetes Medications: Januvia 100 mg,  glimipiride 1 mg.       If insulin vial or pen preference:na  Prior failed/or not tolerated medication therapies:metformin-diarrhea, Trulicity-cost, Toujeo--pt c/o being hungry all the time.   Diabetes Complications:   Aspirin: 81 mg   Statin: pt refuses   ACE/ARB: irbesartan 300 mg   Last Urine Microalbumin:    Last Eye exam:annuall   Last Diabetic Education:    Glucometer:none     Review of Systems   Constitutional: Positive for activity change. Negative for fatigue.   HENT: Negative for hearing loss and trouble  swallowing.    Eyes: Negative for photophobia and visual disturbance.        Last Eye Exam: UTD.    Respiratory: Negative for cough and shortness of breath.    Cardiovascular: Negative for chest pain and palpitations.   Gastrointestinal: Positive for diarrhea (this continues to be intermittent--as a result has not restarted metformin, --is seeing gastro--added fiber-improved. ). Negative for constipation.   Genitourinary: Negative for frequency and urgency.   Musculoskeletal: Positive for arthralgias (ankles). Negative for myalgias.   Skin: Negative for rash and wound.   Allergic/Immunologic: Negative for food allergies.   Neurological: Negative for weakness and numbness.   Psychiatric/Behavioral: Negative for sleep disturbance. The patient is not nervous/anxious.        Objective:      Physical Exam  Constitutional:       Appearance: She is well-developed.      Comments: Appears younger than age, well groomed.    HENT:      Head: Normocephalic and atraumatic.      Nose: Nose normal.   Eyes:      Conjunctiva/sclera: Conjunctivae normal.      Pupils: Pupils are equal, round, and reactive to light.   Neck:      Musculoskeletal: Normal range of motion and neck supple.      Thyroid: No thyromegaly.      Trachea: No tracheal deviation.   Cardiovascular:      Rate and Rhythm: Normal rate and regular rhythm.      Heart sounds: Normal heart sounds.   Pulmonary:      Effort: Pulmonary effort is normal.      Breath sounds: Normal breath sounds.   Musculoskeletal: Normal range of motion.         General: No deformity.      Comments: Feet: No open wounds or calluses.  Bunions, toe deformities,. Note some marked callus b/w some toes. .   Vibratory sensation to feet markedly decreased bilaterally      Skin:     General: Skin is warm and dry.   Neurological:      Mental Status: She is alert and oriented to person, place, and time.   Psychiatric:         Behavior: Behavior normal.         Thought Content: Thought content normal.          Judgment: Judgment normal.           Hemoglobin A1C   Date Value Ref Range Status   06/12/2020 8.0 (H) 4.0 - 5.6 % Final     Comment:     ADA Screening Guidelines:  5.7-6.4%  Consistent with prediabetes  >or=6.5%  Consistent with diabetes  High levels of fetal hemoglobin interfere with the HbA1C  assay. Heterozygous hemoglobin variants (HbS, HgC, etc)do  not significantly interfere with this assay.   However, presence of multiple variants may affect accuracy.     01/17/2020 8.2 (H) 0.0 - 5.6 % Final     Comment:     Reference Interval:  5.0 - 5.6 Normal   5.7 - 6.4 High Risk   > 6.5 Diabetic    Hgb A1c results are standardized based on the (NGSP) National   Glycohemoglobin Standardization Program.    Hemoglobin A1C levels are related to mean serum/plasma glucose   during the preceding 2-3 months.        01/07/2020 8.3 (H) 4.0 - 5.6 % Final     Comment:     ADA Screening Guidelines:  5.7-6.4%  Consistent with prediabetes  >or=6.5%  Consistent with diabetes  High levels of fetal hemoglobin interfere with the HbA1C  assay. Heterozygous hemoglobin variants (HbS, HgC, etc)do  not significantly interfere with this assay.   However, presence of multiple variants may affect accuracy.         Chemistry        Component Value Date/Time     01/17/2020 0813     08/05/2015 0840    K 4.5 01/17/2020 0813    K 4.1 08/05/2015 0840    CL 99 01/17/2020 0813    CL 99 08/05/2015 0840    CO2 33 (H) 01/17/2020 0813    BUN 16 01/17/2020 0813    CREATININE 0.70 01/17/2020 0813    CREATININE 0.64 08/05/2015 0840     (H) 01/17/2020 0813        Component Value Date/Time    CALCIUM 9.8 01/17/2020 0813    ALKPHOS 92 01/17/2020 0813    AST 24 01/17/2020 0813    AST 27 02/12/2016 0728    ALT 19 01/17/2020 0813    BILITOT 0.7 01/17/2020 0813    ESTGFRAFRICA >60 01/17/2020 0813    EGFRNONAA >60 01/17/2020 0813        Lab Results   Component Value Date    LDLCALC 102.0 01/17/2020     Lab Results   Component Value Date    TSH  2.620 05/08/2020       Assessment:       1. Type 2 diabetes mellitus with diabetic polyneuropathy, without long-term current use of insulin  [facility] DIABETES FOOT EXAM  Chronic-uncontrolled-no change     Ambulatory referral/consult to Podiatry    Hemoglobin A1C   2. Benign essential HTN  Chronic-stable-cont irbesartan 300 mg    3. Hypercholesterolemia  Chronic-refuses statin--on whelchol,    4. Polyneuropathy associated with underlying disease  Chronic-stable-monitor foot care    5. Callus of foot  New dx-high risk for ulcer-cont pod          Plan:   Continue  Januvia to 100 mg a day.   Continue  1 mg glimipiride in interim.      Counseled on Medicare benefits per diabetes testing supplies.  Written RX for same to take to pharmacy that takes assignments.   Message sent to see if pt qualifies for Januvia assistance. I    ORDERS 06/19/2020  4   mo with  a1c prior --likes early appt   Cons Podiatry

## 2020-06-24 ENCOUNTER — OFFICE VISIT (OUTPATIENT)
Dept: NEUROLOGY | Facility: CLINIC | Age: 75
End: 2020-06-24
Payer: MEDICARE

## 2020-06-24 VITALS
BODY MASS INDEX: 28.18 KG/M2 | DIASTOLIC BLOOD PRESSURE: 72 MMHG | HEIGHT: 67 IN | HEART RATE: 84 BPM | WEIGHT: 179.56 LBS | SYSTOLIC BLOOD PRESSURE: 133 MMHG | TEMPERATURE: 97 F

## 2020-06-24 DIAGNOSIS — R41.3 MEMORY LOSS: Primary | ICD-10-CM

## 2020-06-24 PROCEDURE — 99214 OFFICE O/P EST MOD 30 MIN: CPT | Mod: S$GLB,,, | Performed by: PSYCHIATRY & NEUROLOGY

## 2020-06-24 PROCEDURE — 1126F AMNT PAIN NOTED NONE PRSNT: CPT | Mod: S$GLB,,, | Performed by: PSYCHIATRY & NEUROLOGY

## 2020-06-24 PROCEDURE — 3075F PR MOST RECENT SYSTOLIC BLOOD PRESS GE 130-139MM HG: ICD-10-PCS | Mod: CPTII,S$GLB,, | Performed by: PSYCHIATRY & NEUROLOGY

## 2020-06-24 PROCEDURE — 1159F PR MEDICATION LIST DOCUMENTED IN MEDICAL RECORD: ICD-10-PCS | Mod: S$GLB,,, | Performed by: PSYCHIATRY & NEUROLOGY

## 2020-06-24 PROCEDURE — 1101F PT FALLS ASSESS-DOCD LE1/YR: CPT | Mod: CPTII,S$GLB,, | Performed by: PSYCHIATRY & NEUROLOGY

## 2020-06-24 PROCEDURE — 3078F PR MOST RECENT DIASTOLIC BLOOD PRESSURE < 80 MM HG: ICD-10-PCS | Mod: CPTII,S$GLB,, | Performed by: PSYCHIATRY & NEUROLOGY

## 2020-06-24 PROCEDURE — 99999 PR PBB SHADOW E&M-EST. PATIENT-LVL V: CPT | Mod: PBBFAC,,, | Performed by: PSYCHIATRY & NEUROLOGY

## 2020-06-24 PROCEDURE — 1126F PR PAIN SEVERITY QUANTIFIED, NO PAIN PRESENT: ICD-10-PCS | Mod: S$GLB,,, | Performed by: PSYCHIATRY & NEUROLOGY

## 2020-06-24 PROCEDURE — 99214 PR OFFICE/OUTPT VISIT, EST, LEVL IV, 30-39 MIN: ICD-10-PCS | Mod: S$GLB,,, | Performed by: PSYCHIATRY & NEUROLOGY

## 2020-06-24 PROCEDURE — 3078F DIAST BP <80 MM HG: CPT | Mod: CPTII,S$GLB,, | Performed by: PSYCHIATRY & NEUROLOGY

## 2020-06-24 PROCEDURE — 1101F PR PT FALLS ASSESS DOC 0-1 FALLS W/OUT INJ PAST YR: ICD-10-PCS | Mod: CPTII,S$GLB,, | Performed by: PSYCHIATRY & NEUROLOGY

## 2020-06-24 PROCEDURE — 99999 PR PBB SHADOW E&M-EST. PATIENT-LVL V: ICD-10-PCS | Mod: PBBFAC,,, | Performed by: PSYCHIATRY & NEUROLOGY

## 2020-06-24 PROCEDURE — 3075F SYST BP GE 130 - 139MM HG: CPT | Mod: CPTII,S$GLB,, | Performed by: PSYCHIATRY & NEUROLOGY

## 2020-06-24 PROCEDURE — 1159F MED LIST DOCD IN RCRD: CPT | Mod: S$GLB,,, | Performed by: PSYCHIATRY & NEUROLOGY

## 2020-06-24 NOTE — PROGRESS NOTES
Date: 6/24/2020    Patient ID: Milady Bright is a 75 y.o. female.    Referring Provider:  TOMASA Ellis Jr., MD    Chief Complaint: Memory Loss      History of Present Illness:  Ms. Bright is a 75 y.o. female who presents referred by TOMASA Ellis Jr., MD today for evaluation of memory loss. She has previously seen Dr. Montesinos and Kacey Lopez for memory, last in 2018.     She was taking aricept 5 mg daily and tried to increase to 10 mg and her head got wobbly. She decreased back down to 5 mg but had nightmares. She is taking it every other night now.  Taking it every other day, the nightmares have subsided.     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 Her short term memory trouble are more difficult for her now. She notes a delay in her memory. She feels she has always had attention difficulties, dating back to grade school.     She has diabetes. She has never had neuropsych testing.     Review of Systems:   14 systems reviewed - All other systems were reviewed and negative except as mentioned in the HPI    Allergies:  Review of patient's allergies indicates:   Allergen Reactions    Invokana [canagliflozin] Other (See Comments)     Diarrhea, yeast infections    Statins-hmg-coa reductase inhibitors      Leg cramps       Current Medications:  Current Outpatient Medications   Medication Sig Dispense Refill    ACCU-CHEK SMARTVIEW TEST STRIP Strp INJECT 1 STRIP INTO THE SKIN 3 (THREE) TIMES DAILY. 300 strip 3    amLODIPine (NORVASC) 10 MG tablet Take 10 mg by mouth once daily.      aspirin (ECOTRIN) 81 MG EC tablet Take 81 mg by mouth once daily.      blood-glucose meter kit To check BG  daily, to use with insurance preferred meter 1 each 0    calcium-vitamin D3 (CALCIUM 500 + D) 500 mg(1,250mg) -200 unit per tablet Take 1 tablet by mouth 2 (two) times daily with meals.      estradiol (ESTRACE) 0.01 % (0.1 mg/gram) vaginal cream Place 1 g vaginally every 7 days.      glimepiride  "(AMARYL) 1 MG tablet Take 1 tablet (1 mg total) by mouth daily before breakfast. 90 tablet 3    hydroCHLOROthiazide (MICROZIDE) 12.5 mg capsule Take 12.5 mg by mouth once daily.      irbesartan (AVAPRO) 300 MG tablet TAKE 1 TABLET EVERY DAY 90 tablet 4    lancets Misc To check BG daily, to use with insurance preferred meter 50 each 12    magnesium 30 mg Tab Take by mouth once.      potassium chloride (MICRO-K) 10 MEQ CpSR TAKE 1 CAPSULE EVERY DAY 90 capsule 4    WELCHOL 625 mg tablet Take 625 mg by mouth 3 (three) times daily with meals.      ADIPEX-P 37.5 mg tablet Take 37.5 mg by mouth once daily.      cholestyramine-aspartame (CHOLESTYRAMINE LIGHT) 4 gram PwPk Take 1 packet (4 g total) by mouth 3 (three) times daily with meals. 30 packet 1    donepezil (ARICEPT) 10 MG tablet TAKE 2 TABLETS (20 MG TOTAL) BY MOUTH EVERY EVENING. (Patient taking differently: Take 10 mg by mouth every evening. ) 60 tablet 14    lancing device with lancets Kit 1 lancet by Misc.(Non-Drug; Combo Route) route 3 (three) times daily. 100 each 11    pen needle, diabetic 32 gauge x 5/32" Ndle 1 each by Misc.(Non-Drug; Combo Route) route Daily. (Patient not taking: Reported on 6/24/2020) 90 each 3    SITagliptin (JANUVIA) 100 MG Tab Take 1 tablet (100 mg total) by mouth once daily. 90 tablet 3     No current facility-administered medications for this visit.        Past Medical History:  Past Medical History:   Diagnosis Date    Diabetes mellitus, type 2     Disorder of bone and cartilage, unspecified     Hyperlipidemia     Hypertension     Memory loss     Other hammer toe (acquired)     Polyp of ascending colon 05/19/2020    2 (7-8 mm) polyps   repeat in 3 years     Polyp of hepatic flexure of colon 05/19/2020    3 ( 3-5mm) polyps   repeat in 3 years     Scoliosis (and kyphoscoliosis), idiopathic     Symptomatic menopausal or female climacteric states     Unspecified hereditary and idiopathic peripheral neuropathy     " "Urinary hesitancy        Past Surgical History:  Past Surgical History:   Procedure Laterality Date    CHOLECYSTECTOMY      COLONOSCOPY  05/19/2020    repeat in 3 years     HYSTERECTOMY      NANCY       Family History:  family history includes Cancer in her maternal grandmother; Diabetes in her father and mother; Heart disease in her mother and paternal grandfather; Parkinsonism in her brother and father; Stroke in her brother.    Social History:   reports that she has never smoked. She has never used smokeless tobacco. She reports that she does not drink alcohol or use drugs.    Physical Exam:  Vitals:    06/24/20 1341   BP: 133/72   Pulse: 84   Temp: 97.1 °F (36.2 °C)   TempSrc: Tympanic   Weight: 81.4 kg (179 lb 9 oz)   Height: 5' 7" (1.702 m)   PainSc: 0-No pain     Body mass index is 28.12 kg/m².  General: Well developed, well nourished.  No acute distress.  Eyes: no ocular hemorrhages  Musculoskeletal: No obvious joint deformities, moves all extremities well.  Peripheral vascular: No edema noted    Neurological Exam:  Mental status: Awake and alert  Speech language: No dysarthria or aphasia on conversation  Cranial nerves: Face symmetric  Motor: Moves all extremities well  Coordination: No ataxia. No tremor.     MMSE 6/24/2020   What is the (year), (season), (date), (day), (month)? 5   Where are we (state), (country), (town or city), (hospital), (floor)? 5   Name 3 common objects (eg. "apple", "table", "charli"). Take 1 second to say each. Then ask the patient to repeat all 3. Give 1 point for each correct answer. Then repeat them until he/she learns all 3. Count trials and record. 3   Serial 7's backwards. Stop after 5 answers. (100,93,86,79,72) or alternatively  spell "WORLD" backwards. (D..L..R..O..W). The score is the number of letters in correct order. 5   Ask for the 3 common objects named earlier in the exam. Give 1 point for each correct answer. 3   Name a "pencil" and "watch." 2   Repeat the " "following: "No ifs, ands, or buts." 1   Follow a 3-stage command: "Take a paper in your right hand, fold it in half, & put it on the floor." 3   Read and obey the following: (see paper exam) 1   Write a sentence. 1   Copy the following design: (see paper exam) 0   Total MMSE Score 29   Some recent data might be hidden       Data:  I have personally reviewed the referring provider's notes, labs, & imaging made available to me today.       Labs:  CBC:   Lab Results   Component Value Date    WBC 8.56 01/17/2020    HGB 14.0 01/17/2020    HCT 41.3 01/17/2020     (H) 01/17/2020    MCV 94 01/17/2020    RDW 12.7 01/17/2020     BMP:   Lab Results   Component Value Date     01/17/2020    K 4.5 01/17/2020    CL 99 01/17/2020    CO2 33 (H) 01/17/2020    BUN 16 01/17/2020    CREATININE 0.70 01/17/2020     (H) 01/17/2020    CALCIUM 9.8 01/17/2020     LFTS;   Lab Results   Component Value Date    PROT 7.6 01/17/2020    ALBUMIN 4.3 01/17/2020    BILITOT 0.7 01/17/2020    AST 24 01/17/2020    ALKPHOS 92 01/17/2020    ALT 19 01/17/2020     COAGS: No results found for: INR, PROTIME, PTT  FLP:   Lab Results   Component Value Date    CHOL 172 01/17/2020    HDL 51 01/17/2020    LDLCALC 102.0 01/17/2020    TRIG 95 01/17/2020    CHOLHDL 29.7 01/17/2020         Imaging:  I have personally reviewed the imaging, MRI brain shows moderate degree of scattered T2 white matter hyperintensities.     Assessment and Plan:  Ms. Bright is a 75 y.o. female referred to me by TOMASA Ellis Jr., MD for evaluation of memory concerns. Her MMSE score is normal, and has been for years. She does have a burden of white matter disease but I think her memory trouble is more related to inattention. I would like to obtain formal neuropsych testing. We may consider switched her aricept to something different if the neuropsych testing shows cognitive impairment. I will visit back with her after neuropsych testing to review results.       Memory " loss  -     Ambulatory consult to Neuropsychology; Future; Expected date: 07/01/2020

## 2020-06-24 NOTE — LETTER
June 24, 2020      TOMASA Ellis Jr., MD  [address]  [facility] [address]           [facility]  [address] [place] [address]  Phone: [phone]  Fax: [phone]          Patient: Milady Bright   MR Number: [id number]   YOB: 1945   Date of Visit: 6/24/2020       Dear Dr. TOMASA Ellis Jr.:    Thank you for referring Milady Bright to me for evaluation. Attached you will find relevant portions of my assessment and plan of care.    If you have questions, please do not hesitate to call me. I look forward to following Milady Bright along with you.    Sincerely,    Ambar Reyes MD    Enclosure  CC:  No Recipients    If you would like to receive this communication electronically, please contact [email or web address] or [phone] to request more information on [employer] Link access.    For providers and/or their staff who would like to refer a patient to [facility], please contact us through our one-stop-shop provider referral line, [facility], at [phone].    If you feel you have received this communication in error or would no longer like to receive these types of communications, please e-mail [email or web address]

## 2020-07-14 ENCOUNTER — OFFICE VISIT (OUTPATIENT)
Dept: PODIATRY | Facility: CLINIC | Age: 75
End: 2020-07-14
Payer: MEDICARE

## 2020-07-14 VITALS — WEIGHT: 179.44 LBS | BODY MASS INDEX: 28.16 KG/M2 | HEIGHT: 67 IN

## 2020-07-14 DIAGNOSIS — M20.42 HAMMER TOES OF BOTH FEET: ICD-10-CM

## 2020-07-14 DIAGNOSIS — E11.42 TYPE 2 DIABETES MELLITUS WITH DIABETIC POLYNEUROPATHY, WITHOUT LONG-TERM CURRENT USE OF INSULIN: Primary | ICD-10-CM

## 2020-07-14 DIAGNOSIS — R20.2 PARESTHESIA OF FOOT, BILATERAL: ICD-10-CM

## 2020-07-14 DIAGNOSIS — M20.41 HAMMER TOES OF BOTH FEET: ICD-10-CM

## 2020-07-14 DIAGNOSIS — L84 CORN OR CALLUS: ICD-10-CM

## 2020-07-14 DIAGNOSIS — M20.11 VALGUS DEFORMITY OF BOTH GREAT TOES: ICD-10-CM

## 2020-07-14 DIAGNOSIS — M20.12 VALGUS DEFORMITY OF BOTH GREAT TOES: ICD-10-CM

## 2020-07-14 PROCEDURE — 99999 PR PBB SHADOW E&M-EST. PATIENT-LVL III: ICD-10-PCS | Mod: PBBFAC,,, | Performed by: PODIATRIST

## 2020-07-14 PROCEDURE — 1159F PR MEDICATION LIST DOCUMENTED IN MEDICAL RECORD: ICD-10-PCS | Mod: S$GLB,,, | Performed by: PODIATRIST

## 2020-07-14 PROCEDURE — 99203 OFFICE O/P NEW LOW 30 MIN: CPT | Mod: S$GLB,,, | Performed by: PODIATRIST

## 2020-07-14 PROCEDURE — 3052F PR MOST RECENT HEMOGLOBIN A1C LEVEL 8.0 - < 9.0%: ICD-10-PCS | Mod: CPTII,S$GLB,, | Performed by: PODIATRIST

## 2020-07-14 PROCEDURE — 1159F MED LIST DOCD IN RCRD: CPT | Mod: S$GLB,,, | Performed by: PODIATRIST

## 2020-07-14 PROCEDURE — 1101F PT FALLS ASSESS-DOCD LE1/YR: CPT | Mod: CPTII,S$GLB,, | Performed by: PODIATRIST

## 2020-07-14 PROCEDURE — 3052F HG A1C>EQUAL 8.0%<EQUAL 9.0%: CPT | Mod: CPTII,S$GLB,, | Performed by: PODIATRIST

## 2020-07-14 PROCEDURE — 1126F AMNT PAIN NOTED NONE PRSNT: CPT | Mod: S$GLB,,, | Performed by: PODIATRIST

## 2020-07-14 PROCEDURE — 99999 PR PBB SHADOW E&M-EST. PATIENT-LVL III: CPT | Mod: PBBFAC,,, | Performed by: PODIATRIST

## 2020-07-14 PROCEDURE — 1101F PR PT FALLS ASSESS DOC 0-1 FALLS W/OUT INJ PAST YR: ICD-10-PCS | Mod: CPTII,S$GLB,, | Performed by: PODIATRIST

## 2020-07-14 PROCEDURE — 99203 PR OFFICE/OUTPT VISIT, NEW, LEVL III, 30-44 MIN: ICD-10-PCS | Mod: S$GLB,,, | Performed by: PODIATRIST

## 2020-07-14 PROCEDURE — 1126F PR PAIN SEVERITY QUANTIFIED, NO PAIN PRESENT: ICD-10-PCS | Mod: S$GLB,,, | Performed by: PODIATRIST

## 2020-07-14 NOTE — PROGRESS NOTES
Subjective:      Patient ID: Milady Bright is a 75 y.o. female.    Chief Complaint: Diabetes Mellitus and Callouses (corn left 2nd toe)    Milady is a 75 y.o. female who presents to the clinic upon referral from Dr. Vasquez  for evaluation and treatment of diabetic feet. Milady has a past medical history of Diabetes mellitus, type 2, Disorder of bone and cartilage, unspecified, Hyperlipidemia, Hypertension, Memory loss, Other hammer toe (acquired), Polyp of ascending colon (05/19/2020), Polyp of hepatic flexure of colon (05/19/2020), Scoliosis (and kyphoscoliosis), idiopathic, Symptomatic menopausal or female climacteric states, Unspecified hereditary and idiopathic peripheral neuropathy, and Urinary hesitancy. Patient relates having a painful callus that develops along the medial aspect of the Lt. 2nd toe.  Notes minimal growth to the site with today's exam.  Denies this being an area of pain.  Relates wearing larger shoes that limit pressure to the site.  Inquires as to how this can be remedied.  Denies noticing drainage from the site or localized signs of infection.  Also, notes cramping in the legs and burning/tingling in the feet while at rest.  Notes symptoms are manageable but a nuisance.  Inquires as to the etiology of said symptoms.  Denies any additional pedal complaints.    Endocrine: GAURANG Rooney  Date Last Seen: 6/20    Hemoglobin A1C   Date Value Ref Range Status   06/12/2020 8.0 (H) 4.0 - 5.6 % Final     Comment:     ADA Screening Guidelines:  5.7-6.4%  Consistent with prediabetes  >or=6.5%  Consistent with diabetes  High levels of fetal hemoglobin interfere with the HbA1C  assay. Heterozygous hemoglobin variants (HbS, HgC, etc)do  not significantly interfere with this assay.   However, presence of multiple variants may affect accuracy.     01/17/2020 8.2 (H) 0.0 - 5.6 % Final     Comment:     Reference Interval:  5.0 - 5.6 Normal   5.7 - 6.4 High Risk   > 6.5 Diabetic    Hgb A1c results are  standardized based on the (NGSP) National   Glycohemoglobin Standardization Program.    Hemoglobin A1C levels are related to mean serum/plasma glucose   during the preceding 2-3 months.        01/07/2020 8.3 (H) 4.0 - 5.6 % Final     Comment:     ADA Screening Guidelines:  5.7-6.4%  Consistent with prediabetes  >or=6.5%  Consistent with diabetes  High levels of fetal hemoglobin interfere with the HbA1C  assay. Heterozygous hemoglobin variants (HbS, HgC, etc)do  not significantly interfere with this assay.   However, presence of multiple variants may affect accuracy.             Past Medical History:   Diagnosis Date    Diabetes mellitus, type 2     Disorder of bone and cartilage, unspecified     Hyperlipidemia     Hypertension     Memory loss     Other hammer toe (acquired)     Polyp of ascending colon 05/19/2020    2 (7-8 mm) polyps   repeat in 3 years     Polyp of hepatic flexure of colon 05/19/2020    3 ( 3-5mm) polyps   repeat in 3 years     Scoliosis (and kyphoscoliosis), idiopathic     Symptomatic menopausal or female climacteric states     Unspecified hereditary and idiopathic peripheral neuropathy     Urinary hesitancy        Past Surgical History:   Procedure Laterality Date    CHOLECYSTECTOMY      COLONOSCOPY  05/19/2020    repeat in 3 years     HYSTERECTOMY      NANCY       Family History   Problem Relation Age of Onset    Diabetes Mother     Heart disease Mother     Parkinsonism Father     Diabetes Father     Parkinsonism Brother     Cancer Maternal Grandmother         breast    Heart disease Paternal Grandfather     Stroke Brother        Social History     Socioeconomic History    Marital status:      Spouse name: Not on file    Number of children: Not on file    Years of education: Not on file    Highest education level: Not on file   Occupational History    Not on file   Social Needs    Financial resource strain: Not very hard    Food insecurity     Worry: Never  true     Inability: Never true    Transportation needs     Medical: No     Non-medical: No   Tobacco Use    Smoking status: Never Smoker    Smokeless tobacco: Never Used   Substance and Sexual Activity    Alcohol use: No     Frequency: Never     Drinks per session: Patient refused     Binge frequency: Never    Drug use: Never    Sexual activity: Yes     Partners: Male     Birth control/protection: Post-menopausal, See Surgical Hx, None   Lifestyle    Physical activity     Days per week: Not on file     Minutes per session: 20 min    Stress: Only a little   Relationships    Social connections     Talks on phone: Three times a week     Gets together: Twice a week     Attends Bahai service: Not on file     Active member of club or organization: No     Attends meetings of clubs or organizations: Never     Relationship status:    Other Topics Concern    Not on file   Social History Narrative    Not on file       Current Outpatient Medications   Medication Sig Dispense Refill    ACCU-CHEK SMARTVIEW TEST STRIP Strp INJECT 1 STRIP INTO THE SKIN 3 (THREE) TIMES DAILY. 300 strip 3    ADIPEX-P 37.5 mg tablet Take 37.5 mg by mouth once daily.      amLODIPine (NORVASC) 10 MG tablet Take 10 mg by mouth once daily.      aspirin (ECOTRIN) 81 MG EC tablet Take 81 mg by mouth once daily.      blood-glucose meter kit To check BG  daily, to use with insurance preferred meter 1 each 0    calcium-vitamin D3 (CALCIUM 500 + D) 500 mg(1,250mg) -200 unit per tablet Take 1 tablet by mouth 2 (two) times daily with meals.      estradiol (ESTRACE) 0.01 % (0.1 mg/gram) vaginal cream Place 1 g vaginally every 7 days.      glimepiride (AMARYL) 1 MG tablet Take 1 tablet (1 mg total) by mouth daily before breakfast. 90 tablet 3    hydroCHLOROthiazide (MICROZIDE) 12.5 mg capsule Take 12.5 mg by mouth once daily.      irbesartan (AVAPRO) 300 MG tablet TAKE 1 TABLET EVERY DAY 90 tablet 4    lancets Misc To check BG  "daily, to use with insurance preferred meter 50 each 12    magnesium 30 mg Tab Take by mouth once.      pen needle, diabetic 32 gauge x 5/32" Ndle 1 each by Misc.(Non-Drug; Combo Route) route Daily. 90 each 3    potassium chloride (MICRO-K) 10 MEQ CpSR TAKE 1 CAPSULE EVERY DAY 90 capsule 4    WELCHOL 625 mg tablet Take 625 mg by mouth 3 (three) times daily with meals.      cholestyramine-aspartame (CHOLESTYRAMINE LIGHT) 4 gram PwPk Take 1 packet (4 g total) by mouth 3 (three) times daily with meals. 30 packet 1    donepezil (ARICEPT) 10 MG tablet TAKE 2 TABLETS (20 MG TOTAL) BY MOUTH EVERY EVENING. (Patient taking differently: Take 10 mg by mouth every evening. ) 60 tablet 14    lancing device with lancets Kit 1 lancet by Misc.(Non-Drug; Combo Route) route 3 (three) times daily. 100 each 11    SITagliptin (JANUVIA) 100 MG Tab Take 1 tablet (100 mg total) by mouth once daily. 90 tablet 3     No current facility-administered medications for this visit.        Review of patient's allergies indicates:   Allergen Reactions    Invokana [canagliflozin] Other (See Comments)     Diarrhea, yeast infections    Statins-hmg-coa reductase inhibitors      Leg cramps         Review of Systems   Constitution: Negative for chills and fever.   Cardiovascular: Negative for claudication and leg swelling.   Skin: Positive for dry skin and suspicious lesions. Negative for color change and nail changes.   Musculoskeletal: Negative for joint swelling, muscle cramps, muscle weakness and myalgias.   Gastrointestinal: Negative for nausea and vomiting.   Neurological: Positive for numbness and paresthesias.   Psychiatric/Behavioral: Negative for altered mental status.           Objective:      Physical Exam  Constitutional:       Appearance: Normal appearance. She is not ill-appearing.   Cardiovascular:      Pulses:           Dorsalis pedis pulses are 2+ on the right side and 2+ on the left side.        Posterior tibial pulses are 2+ " on the right side and 2+ on the left side.      Comments: CFT is < 3 seconds bilateral.  Pedal hair growth is present bilateral.  Mild varicosities noted bilateral.  No lower extremity edema noted bilateral.  Toes are warm to touch bilateral.    Musculoskeletal:         General: Deformity present. No tenderness or signs of injury.      Right lower leg: No edema.      Left lower leg: No edema.      Comments: Muscle strength 5/5 in all muscle groups bilateral.  No tenderness nor crepitation with ROM of foot/ankle joints bilateral.  No tenderness with palpation of bilateral foot and ankle.  Bilateral HAV.  Bilateral semi-rigid contracture of toes 2-5.  Bilateral dorsal midfoot exostoses.     Skin:     General: Skin is warm and dry.      Capillary Refill: Capillary refill takes less than 2 seconds.      Findings: Lesion present. No bruising, ecchymosis, erythema, laceration, rash or wound.      Comments: Pedal skin has normal turgor, temperature, and texture bilateral.  Toenails x 10 appear normotrophic.  Hyperkeratotic lesion noted to the medial aspect of the Lt. 2nd toe and to the plantar base of the digit.   Examination of the skin reveals no evidence of significant maceration, rashes, open lesions, suspicious appearing nevi or other concerning lesions.    Neurological:      General: No focal deficit present.      Mental Status: She is alert.      Sensory: Sensory deficit present.      Motor: Motor function is intact.      Comments: Protective sensation per Mongo-Rashad monofilament is decreased on the Rt. And intact on the Lt.  Vibratory sensation is decreased bilateral.  Light touch is intact bilateral.               Assessment:       Encounter Diagnoses   Name Primary?    Type 2 diabetes mellitus with diabetic polyneuropathy, without long-term current use of insulin     Hammer toes of both feet Yes    Valgus deformity of both great toes     Corn or callus     Paresthesia of foot, bilateral          Plan:        Milady was seen today for diabetes mellitus and callouses.    Diagnoses and all orders for this visit:    Hammer toes of both feet    Type 2 diabetes mellitus with diabetic polyneuropathy, without long-term current use of insulin  -     Ambulatory referral/consult to Podiatry    Valgus deformity of both great toes    Corn or callus    Paresthesia of foot, bilateral      I counseled the patient on her conditions, their implications and medical management.    Fitted and dispensed a toe spacer for the Lt. 1st webspace to minimize callus build up.    Given information regarding amlactin, as this will help to decrease callus bulk.    Recommend continued use of shoe gear with an accommodating toe box.    Given both verbal and written information regarding alpha lipoic acid and B-complex vitamins.  Take as instructed in clinic.    Shoe inspection. Diabetic Foot Education. Patient reminded of the importance of good nutrition and blood sugar control to help prevent podiatric complications of diabetes. Patient instructed on proper foot hygeine. We discussed wearing proper shoe gear, daily foot inspections, never walking without protective shoe gear, never putting sharp instruments to feet    With patient's permission, a sterile #15 scalpel was used to trim lesions x 2 down to healthy appearing skin without incident.   Patient relates relief following the procedure.  This was performed as a courtesy with today's exam.  She will continue to monitor the areas daily, inspect her feet, wear protective shoe gear when ambulatory, moisturizer to maintain skin integrity and follow in this office in approximately 12 months, sooner p.r.n.    Follow up in about 1 year (around 7/14/2021).    Jose Sapp DPM

## 2020-07-14 NOTE — LETTER
July 14, 2020      GAURANG Rees,ANP-C  [address] [facility] [address] [place] [address]           [facility]  [address] [place] [address]  Phone: [phone]          Patient: Milady Bright   MR Number: [id number]   YOB: 1945   Date of Visit: 7/14/2020       Dear Demi Vasquez:    Thank you for referring Milady Bright to me for evaluation. Attached you will find relevant portions of my assessment and plan of care.    If you have questions, please do not hesitate to call me. I look forward to following Milady Bright along with you.    Sincerely,    Jose Sapp, CARON    Enclosure  CC:  No Recipients    If you would like to receive this communication electronically, please contact [email or web address] or [phone] to request more information on [employer] Link access.    For providers and/or their staff who would like to refer a patient to [facility], please contact us through our one-stop-shop provider referral line, [facility], at [phone].    If you feel you have received this communication in error or would no longer like to receive these types of communications, please e-mail [email or web address]

## 2020-07-15 DIAGNOSIS — E11.8 TYPE 2 DIABETES MELLITUS WITH COMPLICATION, WITHOUT LONG-TERM CURRENT USE OF INSULIN: ICD-10-CM

## 2020-07-16 NOTE — TELEPHONE ENCOUNTER
"S/w pt. Advised s/w Tonya Latif pharmacy assistance and advised of Tonay's message of "She's on my list to contact, let her know i'll be calling." Pt verbalized understanding.   "
----- Message from Regi Sesay sent at 7/15/2020 11:34 AM CDT -----  Contact: pt  Type: Needs Medical Advice    Who Called:  PT  Best Call Back Number: [phone]  Additional Information: Requesting a call back regarding pt medication SITagliptin (JANUVIA) 100 MG Tab and wanted to speak with nurse about it   Please Advise ---Thank you      
----- Message from Tricia Orta sent at 7/15/2020  1:31 PM CDT -----  Regarding: pt have questions  Pt needing a call regarding questions for the staff    Pt contact #[phone]         
Patient stating she needs assistance with getting refills for her Januvia. She said she had it free for a year and it supposed to fill out paperwork for you. Please advise.   
See if ok sending to [facility] and can see if they can help  
The patient is a 15y Male complaining of urinary symptoms.

## 2020-08-26 DIAGNOSIS — E11.8 TYPE 2 DIABETES MELLITUS WITH COMPLICATION, WITHOUT LONG-TERM CURRENT USE OF INSULIN: ICD-10-CM

## 2020-08-26 DIAGNOSIS — E11.42 TYPE 2 DIABETES MELLITUS WITH DIABETIC POLYNEUROPATHY, WITHOUT LONG-TERM CURRENT USE OF INSULIN: ICD-10-CM

## 2020-08-26 NOTE — TELEPHONE ENCOUNTER
----- Message from [facility] sent at 8/26/2020  3:29 PM CDT -----  Pt called to get a refill on SITagliptin (JANUVIA) 100 MG Tab    [facility] [phone]     Pt can be reached at [phone]

## 2020-08-27 RX ORDER — BLOOD SUGAR DIAGNOSTIC
STRIP MISCELLANEOUS
Qty: 100 STRIP | Refills: 11 | Status: SHIPPED | OUTPATIENT
Start: 2020-08-27 | End: 2023-11-08 | Stop reason: CLARIF

## 2020-08-27 NOTE — TELEPHONE ENCOUNTER
----- Message from Reddy Rivera sent at 8/27/2020 11:17 AM CDT -----  Regarding: Refill  Contact: patient  Type:  RX Refill Request    Who Called:  Patient   Refill or New Rx:  Refill  RX Name and Strength:  JANUVIA 100 MG and ACCU-CHEK SMARTVIEW TEST STRIP     How is the patient currently taking it? (ex. 1XDay):  one time a day  Is this a 30 day or 90 day RX:  30day  Preferred Pharmacy with phone number:    [facility] [address] [facility] [address] [place] [address]  Phone: [phone] Fax: [phone]     Local or Mail Order:  Local  Ordering Provider:  Demi Guzman Call Back Number:  [phone] (home)     Case number [id number]

## 2020-09-09 ENCOUNTER — TELEPHONE (OUTPATIENT)
Dept: NEUROLOGY | Facility: CLINIC | Age: 75
End: 2020-09-09

## 2020-09-09 ENCOUNTER — OFFICE VISIT (OUTPATIENT)
Dept: NEUROLOGY | Facility: CLINIC | Age: 75
End: 2020-09-09
Payer: MEDICARE

## 2020-09-09 DIAGNOSIS — R45.89 DEPRESSED MOOD: ICD-10-CM

## 2020-09-09 DIAGNOSIS — R41.3 MEMORY LOSS: ICD-10-CM

## 2020-09-09 PROCEDURE — 98968 PR NONPHYSICIAN TELEPHONE ASSESSMENT 21-30 MIN: ICD-10-PCS | Mod: 95,,, | Performed by: PSYCHIATRY & NEUROLOGY

## 2020-09-09 PROCEDURE — 99499 UNLISTED E&M SERVICE: CPT | Mod: 95,,, | Performed by: PSYCHIATRY & NEUROLOGY

## 2020-09-09 PROCEDURE — 99499 NO LOS: ICD-10-PCS | Mod: 95,,, | Performed by: PSYCHIATRY & NEUROLOGY

## 2020-09-09 PROCEDURE — 98968 PH1 ASSMT&MGMT NQHP 21-30: CPT | Mod: 95,,, | Performed by: PSYCHIATRY & NEUROLOGY

## 2020-09-09 NOTE — PROGRESS NOTES
NEUROPSYCHOLOGY CONSULT (TELEHEALTH)    Referral Information  Name: Milady Bright  MRN: [id number]  : 1945  Age: 75 y.o.  Race: White  Gender: female  Referring Provider: Ambar Reyes MD  Billing: See below for details as coding/billing has changed   Telemedicine:   The patient location is: [place], [place]  The provider location is: [place], [place]  The chief complaint leading to consultation/medical necessity is: Memory concerns  Visit type: Virtual visit with synchronous audio (telephone)  Total time spent with patient: 45 minutes  Each patient to whom he or she provides medical services by telemedicine is:  (1) informed of the relationship between the physician and patient and the respective role of any other health care provider with respect to management of the patient; and (2) notified that he or she may decline to receive medical services by telemedicine and may withdraw from such care at any time. The reason for the audio only service rather than synchronous audio and video virtual visit was related to technical difficulties or patient preference/necessity. Patient verbally consented to receive this service via voice-only telephone call.  Consent/Emergency Plan: The patient expressed an understanding of the purpose of the evaluation and consented to all procedures. I informed the patient of limits to confidentiality and discussed an emergency plan. This service was not originating from a related E/M service provided within the previous 7 days nor will  to an E/M service or procedure within the next 24 hours or my soonest available appointment.  Prevailing standard of care was able to be met in this audio-only visit.      SUMMARY/TREATMENT PLAN   Results from the interview indicate the following diagnoses and treatment plan recommendations. The patient has the ability to follow a treatment plan without help from family.    Diagnoses/Plan:  Problem List Items Addressed This Visit        Neuro    Memory  "loss    Current Assessment & Plan     Ms. Bright has been followed intermittently by Neurology for memory concerns starting in approximately 2012. Cognitive screening scores have been largely stable, with one exception using a different test. She continues to report cognitive concerns in the context of independence in activities of daily living and recent mood changes related to COVID-19.     Recommendations: Ms. Bright will be seen for assessment on 10/14/2020.            Psychiatric    Depressed mood    Current Assessment & Plan     Ms. Bright reported recent intermittent depressed mood related to loneliness due to COVID-19 restrictions.    Recommendations: We will assess mood during testing and provide treatment recommendations, if needed.             Thank you for allowing me to participate in Ms. Bright's care.  If you have any questions, please contact me at [phone].Renee Reddy, Ph.D., ABPPBoard Certified in Clinical NeuropsychologyOchsner Health  Department of Neurology    HISTORY OF PRESENT ILLNESS AND CURRENT SYMPTOMS     Ms. Bright has active problems noted below. She has been seen by neurology since 2014, with reported memory loss since 2012. Mini Mental State Exam (MMSE) scores have been within normal limits (2014 = 28/30; 2018 = 30/30; 2020 = 29/30). Her score on the [place] Cognitive Assessment was reduced in 2017 (MoCA = 25/30, difficulty with executive functioning and verbal fluency). During the interview, Ms. 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 She feels like she "lost some of the skills I had."     Cognitive Symptoms:   Type/Examples:   · Attention: She reported longstanding attention difficulty. She has trouble focusing on new information.  · Mental Speed: Mental speed depends on the task.  · Memory: Ms. Bright reported a delay in memory, but it eventually comes to her. She can forget something that just " happened.  · Language: She reported word-finding difficulty; the word usually comes to her. No problem with receptive language difficulty.  · Visuospatial/Perceptual: None reported.  · Executive Functioning: She thinks she would have an easier time if she writes information down.   Onset: Gradual over the last two years   Course: Slowly progressive    Current Functional Status/Needs:  ADLs  Self-Care Eating Safety Other   Independent Independent Independent      Instrumental IADLs:   Driving Medications/Health Household Finances   Independent Independent Independent Independent     Psychiatric/Behavioral Symptoms:  Mood:  Depression/Dysphoria Anxiety/Fearfulness Irritability   She reported a bit more depressed mood due to COVID-19. She works two days a week but is lonely because she is not able to visit her brothers. This is not consistent. None reported. She feels somewhat more irritable but does not know why. She feels less patient since COVID and the upcoming election.     Behavior:  Agitation/Resistance Delusions/Paranoia Hallucinations   None reported. None reported. None reported.     Apathy/Motivation Repetitive/Restlessness Other   She has more trouble with motivation. None reported.      Neurovegetative:  Sleep/Nighttime  Appetite Energy   Ms. Bright said she has trouble sleeping at night. She has diabetes and gets up multiple times at night. This is over the last few months; she can fall back to sleep. None reported. She reported declines in energy      Suicidal/Homicidal Ideation: None reported.    Physical Symptoms: No falls in over a year; she has pain. She does not have any significant changes in hearing or vision but noted slight changes.     PERTINENT BACKGROUND INFORMATION   SOCIAL HISTORY    · Family Status: She has two children, one boy and one girl.  · Current Living Situation: Lives independently.  · Primary Source of Support: daughter  · [place] goes to the doctor on her days off or visits her daughter or neighbor.  · Stressors: COVID-19  · Other Factors:  · Educational Level: She completed high school and two years of college. She had to work very hard to focus. She got some help in elementary school (tutoring); she denied special education services.  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  · Other:    Family History   Problem Relation Age of Onset    Diabetes Mother     Heart disease Mother     Parkinsonism Father     Diabetes Father     Parkinsonism Brother     Cancer Maternal Grandmother         breast    Heart disease Paternal Grandfather     Stroke Brother      Family Neurologic History: Parkinsonism  Family Psychiatric History: Negative for heritable risk factors    MEDICAL STATUS  Patient Active Problem List   Diagnosis    Type 2 diabetes mellitus with complication, without long-term current use of insulin    Benign essential HTN    Memory loss    Hereditary and idiopathic neuropathy    Disorder of bone and cartilage, unspecified    Allergy to statin medication    Atherosclerotic cerebrovascular disease    Attention and concentration deficit    Essential thrombocytosis    Aspirin long-term use    Hypercholesterolemia    Chronic diarrhea of unknown origin    Type 2 diabetes mellitus with diabetic polyneuropathy, without long-term current use of insulin    Type 2 diabetes mellitus with atherosclerosis of aorta    Clubbing of toes of right foot    Crossover toe deformity of left foot    Callus of foot    Colon polyp    Depressed mood     Past Medical History:   Diagnosis Date    Diabetes mellitus, type 2     Disorder of bone and cartilage, unspecified     Hyperlipidemia     Hypertension     Memory loss     Other hammer toe (acquired)     Polyp of ascending colon 05/19/2020    2 (7-8 mm) polyps   repeat in 3 years     Polyp of hepatic flexure of colon  05/19/2020    3 ( 3-5mm) polyps   repeat in 3 years     Scoliosis (and kyphoscoliosis), idiopathic     Symptomatic menopausal or female climacteric states     Unspecified hereditary and idiopathic peripheral neuropathy     Urinary hesitancy      Past Surgical History:   Procedure Laterality Date    CHOLECYSTECTOMY      COLONOSCOPY  05/19/2020    repeat in 3 years     HYSTERECTOMY      NANCY     Updated/Relevant Neurologic History:  · Falls: Not for one year  · TBI: None reported  · Seizures: None reported  · Stroke: None reported  · Movement Concerns: None reported  · Referral Diagnosis: Memory loss    Recent Labs and Imaging  Lab Results   Component Value Date    [id number] 573 05/16/2017     Lab Results   Component Value Date    RPR Non-reactive 10/14/2014     Lab Results   Component Value Date    FOLATE 13.5 10/14/2014     Lab Results   Component Value Date    TSH 2.620 05/08/2020     Lab Results   Component Value Date    HGBA1C 8.0 (H) 06/12/2020     No results found for: HIV1X2, [id number]    Recent Imaging    Brain MRI 3/28/2017:  IMPRESSION:      1.  Age-related microvascular ischemic changes of the bilateral cerebral white matter.  2.  Small right maxillary sinus retention cyst.    Current Outpatient Medications:     ADIPEX-P 37.5 mg tablet, Take 37.5 mg by mouth once daily., Disp: , Rfl:     amLODIPine (NORVASC) 10 MG tablet, Take 10 mg by mouth once daily., Disp: , Rfl:     aspirin (ECOTRIN) 81 MG EC tablet, Take 81 mg by mouth once daily., Disp: , Rfl:     blood sugar diagnostic (ACCU-CHEK SMARTVIEW TEST STRIP) Strp, INJECT 1 STRIP INTO THE SKIN 3 (THREE) TIMES DAILY., Disp: 100 strip, Rfl: 11    blood-glucose meter kit, To check BG  daily, to use with insurance preferred meter, Disp: 1 each, Rfl: 0    calcium-vitamin D3 (CALCIUM 500 + D) 500 mg(1,250mg) -200 unit per tablet, Take 1 tablet by mouth 2 (two) times daily with meals., Disp: , Rfl:     cholestyramine-aspartame (CHOLESTYRAMINE LIGHT)  "4 gram PwPk, Take 1 packet (4 g total) by mouth 3 (three) times daily with meals., Disp: 30 packet, Rfl: 1    donepezil (ARICEPT) 10 MG tablet, TAKE 2 TABLETS (20 MG TOTAL) BY MOUTH EVERY EVENING. (Patient taking differently: Take 10 mg by mouth every evening. ), Disp: 60 tablet, Rfl: 14    estradiol (ESTRACE) 0.01 % (0.1 mg/gram) vaginal cream, Place 1 g vaginally every 7 days., Disp: , Rfl:     glimepiride (AMARYL) 1 MG tablet, Take 1 tablet (1 mg total) by mouth daily before breakfast., Disp: 90 tablet, Rfl: 3    hydroCHLOROthiazide (MICROZIDE) 12.5 mg capsule, Take 12.5 mg by mouth once daily., Disp: , Rfl:     irbesartan (AVAPRO) 300 MG tablet, TAKE 1 TABLET EVERY DAY, Disp: 90 tablet, Rfl: 4    lancets Misc, To check BG daily, to use with insurance preferred meter, Disp: 50 each, Rfl: 12    lancing device with lancets Kit, 1 lancet by Misc.(Non-Drug; Combo Route) route 3 (three) times daily., Disp: 100 each, Rfl: 11    magnesium 30 mg Tab, Take by mouth once., Disp: , Rfl:     pen needle, diabetic 32 gauge x 5/32" Ndle, 1 each by Misc.(Non-Drug; Combo Route) route Daily., Disp: 90 each, Rfl: 3    potassium chloride (MICRO-K) 10 MEQ CpSR, TAKE 1 CAPSULE EVERY DAY, Disp: 90 capsule, Rfl: 0    SITagliptin (JANUVIA) 100 MG Tab, Take 1 tablet (100 mg total) by mouth once daily., Disp: 30 tablet, Rfl: 11    WELCHOL 625 mg tablet, Take 625 mg by mouth 3 (three) times daily with meals., Disp: , Rfl:     Updated/Relevant Psychiatric History: None reported.    Substance Use: She reported a glass or two of wine on occasion. She had difficulty with alcohol abuse in the distant past.    MENTAL STATUS AND OBSERVATIONS:  APPEARANCE: Not assessed  ALERTNESS/ORIENTATION: Attentive and alert. Fully oriented (x5) to time and place  GAIT: Not assessed  MOTOR MOVEMENTS/MANNERISMS: Not assessed  SPEECH/LANGUAGE: Normal in rate, rhythm, tone, and volume. No significant word finding difficulty noted. Expressive and " "receptive language was normal.  STATED MOOD/AFFECT: The patients stated mood was "okay mood, it's fine." Affect was congruent with stated mood.   INTERPERSONAL BEHAVIOR: Rapport was quickly and easily established   SUICIDALITY/HOMICIDALITY: Denied  HALLUCINATIONS/DELUSIONS: None evidenced or endorsed  THOUGHT PROCESSES/INSIGHT: Thoughts seemed logical and goal-directed.     BILLING  Service Description CPT Code Minutes Units   Psychiatric diagnostic evaluation by physician [id number] [phone]   Neurobehavioral status exam by physician [id number]  0   Each additional hour by physician [id number]  0   Test Evaluation Services --  --   Neuropsychological testing evaluation services by physician [id number]  0   Each additional hour by physician [id number]  0   Test Administration and Scoring --  --   Psychological or neuropsychological test administration and scoring by physician [id number]  0   Each additional 30 minutes by physician [id number]  0   Psychological or neuropsychological test administration and scoring by technician [id number]  0   Each additional 30 minutes by technician [id number]  0       "

## 2020-09-10 PROBLEM — R45.89 DEPRESSED MOOD: Status: ACTIVE | Noted: 2020-09-10

## 2020-09-10 NOTE — ASSESSMENT & PLAN NOTE
Ms. Bright has been followed intermittently by Neurology for memory concerns starting in approximately 2012. Cognitive screening scores have been largely stable, with one exception using a different test. She continues to report cognitive concerns in the context of independence in activities of daily living and recent mood changes related to COVID-19.     Recommendations: Ms. Bright will be seen for assessment on 10/14/2020.

## 2020-09-10 NOTE — ASSESSMENT & PLAN NOTE
Ms. Bright reported recent intermittent depressed mood related to loneliness due to COVID-19 restrictions.    Recommendations: We will assess mood during testing and provide treatment recommendations, if needed.

## 2020-09-16 ENCOUNTER — TELEPHONE (OUTPATIENT)
Dept: NEUROLOGY | Facility: CLINIC | Age: 75
End: 2020-09-16

## 2020-10-13 ENCOUNTER — LAB VISIT (OUTPATIENT)
Dept: LAB | Facility: HOSPITAL | Age: 75
End: 2020-10-13
Attending: NURSE PRACTITIONER
Payer: MEDICARE

## 2020-10-13 DIAGNOSIS — E11.42 TYPE 2 DIABETES MELLITUS WITH DIABETIC POLYNEUROPATHY, WITHOUT LONG-TERM CURRENT USE OF INSULIN: ICD-10-CM

## 2020-10-13 PROCEDURE — 83036 HEMOGLOBIN GLYCOSYLATED A1C: CPT

## 2020-10-13 PROCEDURE — 36415 COLL VENOUS BLD VENIPUNCTURE: CPT | Mod: PO

## 2020-10-14 LAB
ESTIMATED AVG GLUCOSE: 157 MG/DL (ref 68–131)
HBA1C MFR BLD HPLC: 7.1 % (ref 4–5.6)

## 2020-10-19 NOTE — PROGRESS NOTES
"    Subjective:    Patient ID:  Milady Bright is a 75 y.o. female.    Chief Complaint:  Diabetes      Pt presents to follow up T2 diabetes. Pt previously seen by ANKIT Vasquez but is new to me.     Other PMH: HTN, HLP, CVD    Overview: diagnosis of Type 2 diabetes mellitus diagnosed approximately 2010.  Other pertinent medical and social information noted includes, but not limited to: NA.   Pt initially treated with Toujeo 35 units--but found she was hungry all the time.  Primary started her on Trulicity which has worked well--but cost prohibitive.  Thinks she was on metformin in the past and possibly with some diarrhea.       Prior failed/or not tolerated medication therapies: metformin-diarrhea, Trulicity-cost, Toujeo--pt c/o being hungry all the time.     Statin: pt refuses (notes the medication just "didn't agree with her," Last LDL 84.     Known diabetic complications: cardiovascular disease    Currently taking:  Januvia 100 mg, egfr >60  glimipiride 1 mg.      Last HbA1C 7.1 % Oct 2020, previously 8% June 2020. Notes she has been exercising more through physical therapy. Has completed the program and tries to continue similar exercise sessions but is not as motivated. Recently started autumn harvest drops.    Home blood sugar records:   Fasting 140  Pre-lunch -  Pre-dinner -  Bedtime -    Current diet: on average, 3 meals per day. Tries not to snack b/w meals. Drinks water and some Gatorade (when stool is loose).   Current exercise: no regular exercise  Any episodes of hypoglycemia? No. Lowest BG <100  Last eye exam: March 2020 and no DR per pt.             Diabetes Management Status    Statin: Not taking  ACE/ARB: Taking    Screening or Prevention Patient's value Goal Complete/Controlled?   HgA1C Testing and Control   Lab Results   Component Value Date    HGBA1C 7.1 (H) 10/13/2020      Annually/Less than 8% Yes   Lipid profile : 08/21/2020 Annually Yes   LDL control Lab Results   Component Value Date    LDLCALC " 84.4 08/21/2020    Annually/Less than 100 mg/dl  Yes   Nephropathy screening Lab Results   Component Value Date    LABMICR 12.8 01/17/2020     Lab Results   Component Value Date    PROTEINUA Negative 01/17/2020    Annually Yes   Blood pressure BP Readings from Last 1 Encounters:   10/20/20 (!) 142/70    Less than 140/90 Yes   Dilated retinal exam : 03/01/2019 Annually Yes   Foot exam   : 06/19/2020 Annually Yes     Review of Systems   Constitutional: Negative for fatigue and unexpected weight change.   Eyes: Negative for visual disturbance.   Respiratory: Positive for shortness of breath.    Cardiovascular: Negative for chest pain and leg swelling.   Gastrointestinal: Positive for diarrhea. Negative for abdominal pain.   Endocrine: Negative for polydipsia, polyphagia and polyuria.   Genitourinary:        +nocturia (wakes up 4x per night)   Musculoskeletal: Negative for myalgias.   Skin: Negative for wound.   Neurological: Negative for numbness and headaches.   Hematological: Negative for adenopathy.   Psychiatric/Behavioral: Negative for sleep disturbance.        Past Medical History:   Diagnosis Date    Diabetes mellitus, type 2     Disorder of bone and cartilage, unspecified     Hyperlipidemia     Hypertension     Memory loss     Other hammer toe (acquired)     Polyp of ascending colon 05/19/2020    2 (7-8 mm) polyps   repeat in 3 years     Polyp of hepatic flexure of colon 05/19/2020    3 ( 3-5mm) polyps   repeat in 3 years     Scoliosis (and kyphoscoliosis), idiopathic     Symptomatic menopausal or female climacteric states     Unspecified hereditary and idiopathic peripheral neuropathy     Urinary hesitancy       Social History     Tobacco Use    Smoking status: Never Smoker    Smokeless tobacco: Never Used   Substance Use Topics    Alcohol use: No     Frequency: Never     Drinks per session: Patient refused     Binge frequency: Never    Drug use: Never     Family History   Problem Relation Age  of Onset    Diabetes Mother     Heart disease Mother     Parkinsonism Father     Diabetes Father     Parkinsonism Brother     Cancer Maternal Grandmother         breast    Heart disease Paternal Grandfather     Stroke Brother       Past Surgical History:   Procedure Laterality Date    CHOLECYSTECTOMY      COLONOSCOPY  05/19/2020    repeat in 3 years     HYSTERECTOMY      NANCY          Current Outpatient Medications:     amLODIPine (NORVASC) 10 MG tablet, Take 10 mg by mouth once daily., Disp: , Rfl:     aspirin (ECOTRIN) 81 MG EC tablet, Take 81 mg by mouth once daily., Disp: , Rfl:     blood sugar diagnostic (ACCU-CHEK SMARTVIEW TEST STRIP) Strp, INJECT 1 STRIP INTO THE SKIN 3 (THREE) TIMES DAILY., Disp: 100 strip, Rfl: 11    calcium-vitamin D3 (CALCIUM 500 + D) 500 mg(1,250mg) -200 unit per tablet, Take 1 tablet by mouth 2 (two) times daily with meals., Disp: , Rfl:     donepezil (ARICEPT) 10 MG tablet, TAKE 2 TABLETS (20 MG TOTAL) BY MOUTH EVERY EVENING. (Patient taking differently: Take 10 mg by mouth every evening. ), Disp: 60 tablet, Rfl: 14    estradiol (ESTRACE) 0.01 % (0.1 mg/gram) vaginal cream, Place 1 g vaginally every 7 days., Disp: , Rfl:     glimepiride (AMARYL) 1 MG tablet, Take 1 tablet (1 mg total) by mouth daily before breakfast., Disp: 90 tablet, Rfl: 3    hydroCHLOROthiazide (MICROZIDE) 12.5 mg capsule, Take 12.5 mg by mouth once daily., Disp: , Rfl:     irbesartan (AVAPRO) 300 MG tablet, TAKE 1 TABLET EVERY DAY, Disp: 90 tablet, Rfl: 4    lancets Misc, To check BG daily, to use with insurance preferred meter, Disp: 50 each, Rfl: 12    magnesium 30 mg Tab, Take by mouth once., Disp: , Rfl:     potassium chloride (MICRO-K) 10 MEQ CpSR, TAKE 1 CAPSULE EVERY DAY, Disp: 90 capsule, Rfl: 0    SITagliptin (JANUVIA) 100 MG Tab, Take 1 tablet (100 mg total) by mouth once daily., Disp: 30 tablet, Rfl: 11    blood-glucose meter kit, To check BG  daily, to use with insurance  "preferred meter, Disp: 1 each, Rfl: 0    metFORMIN (GLUCOPHAGE-XR) 500 MG ER 24hr tablet, Take 1 tablet (500 mg total) by mouth 2 (two) times daily with meals., Disp: 60 tablet, Rfl: 11    WELCHOL 625 mg tablet, Take 625 mg by mouth 3 (three) times daily with meals., Disp: , Rfl:      Review of patient's allergies indicates:   Allergen Reactions    Invokana [canagliflozin] Other (See Comments)     Diarrhea, yeast infections    Atorvastatin calcium Other (See Comments)    Fenofibrate nanocrystallized Other (See Comments)    Statins-hmg-coa reductase inhibitors      Leg cramps        Objective:   BP (!) 142/70   Pulse 76   Ht 5' 7" (1.702 m)   Wt 81 kg (178 lb 7.4 oz)   SpO2 98%   BMI 27.95 kg/m²   BP Readings from Last 3 Encounters:   10/20/20 (!) 142/70   08/26/20 120/60   06/24/20 133/72     Wt Readings from Last 3 Encounters:   10/20/20 0817 81 kg (178 lb 7.4 oz)   08/26/20 1326 81.7 kg (180 lb 3.2 oz)   07/14/20 0751 81.4 kg (179 lb 7.3 oz)          Physical Exam  Vitals signs reviewed.   Constitutional:       General: She is not in acute distress.     Appearance: Normal appearance. She is well-developed. She is not ill-appearing, toxic-appearing or diaphoretic.      Comments: Elderly lady. Appears stated age   HENT:      Head: Normocephalic and atraumatic.      Right Ear: External ear normal.      Left Ear: External ear normal.   Eyes:      General: No scleral icterus.  Neck:      Trachea: No tracheal deviation.   Pulmonary:      Effort: Pulmonary effort is normal. No respiratory distress.   Abdominal:      General: There is no distension.   Neurological:      General: No focal deficit present.      Mental Status: She is alert and oriented to person, place, and time.   Psychiatric:         Thought Content: Thought content normal.           Lab Results   Component Value Date     (L) 08/21/2020     08/05/2015    K 4.3 08/21/2020    K 4.1 08/05/2015    CL 97 08/21/2020    CL 99 08/05/2015    " CO2 31 08/21/2020    BUN 22 (H) 08/21/2020    CREATININE 0.77 08/21/2020    CREATININE 0.64 08/05/2015     (H) 08/21/2020    HGBA1C 7.1 (H) 10/13/2020    AST 24 01/17/2020    AST 27 02/12/2016    ALT 19 01/17/2020    ALBUMIN 4.3 01/17/2020    PROT 7.6 01/17/2020    BILITOT 0.7 01/17/2020    WBC 8.56 01/17/2020    HGB 14.0 01/17/2020    HCT 41.3 01/17/2020    MCV 94 01/17/2020    MCH 32.0 (H) 01/17/2020     (H) 01/17/2020    MPV 9.4 01/17/2020    GRAN 6.3 01/17/2020    GRAN 73.2 (H) 01/17/2020    LYMPH 1.2 01/17/2020    LYMPH 13.4 (L) 01/17/2020    CHOL 170 08/21/2020    HDL 60 08/21/2020    LDLCALC 84.4 08/21/2020    LDLCALC 126 08/05/2015    TRIG 128 08/21/2020       Lab Results   Component Value Date    TSH 2.620 05/08/2020        Thyroid Labs Latest Ref Rng & Units 1/17/2020 5/8/2020 8/21/2020   TSH 0.400 - 4.000 uIU/mL - 2.620 -   Sodium 136 - 145 mmol/L 139 - 134(L)   Potassium 3.5 - 5.1 mmol/L 4.5 - 4.3   Chloride 95 - 110 mmol/L 99 - 97   Carbon Dioxide 22 - 31 mmol/L 33(H) - 31   Glucose 70 - 110 mg/dL 219(H) - 219(H)   Blood Urea Nitrogen 7 - 18 mg/dL 16 - 22(H)   Creatinine 0.50 - 1.40 mg/dL 0.70 - 0.77   Calcium 8.4 - 10.2 mg/dL 9.8 - 9.4   Total Protein 6.0 - 8.4 g/dL 7.6 - -   Albumin 3.5 - 5.2 g/dL 4.3 - -   Total Bilirubin 0.2 - 1.3 mg/dL 0.7 - -   AST 14 - 36 U/L 24 - -   ALT 10 - 44 U/L 19 - -   Anion Gap 8 - 16 mmol/L 7(L) - 6(L)   eGFR (African American) >60 mL/min/1.73 m:2 >60 - >60   eGFR (Non-African American) >60 mL/min/1.73 m:2 >60 - >60   WBC 3.90 - 12.70 K/uL 8.56 - -   RBC 4.00 - 5.40 M/uL 4.38 - -   Hemoglobin 12.0 - 16.0 g/dL 14.0 - -   Hematocrit 37.0 - 48.5 % 41.3 - -   MCV 82 - 98 fL 94 - -   MCH 27.0 - 31.0 pg 32.0(H) - -   MCHC 32.0 - 36.0 g/dL 33.9 - -   RDW 11.5 - 14.5 % 12.7 - -   Platelets 150 - 350 K/uL 435(H) - -   MPV 9.2 - 12.9 fL 9.4 - -   Gran # 1.8 - 7.7 K/uL 6.3 - -   Lymph # 1.0 - 4.8 K/uL 1.2 - -   Mono # 0.3 - 1.0 K/uL 0.8 - -   Eos # 0.0 - 0.5 K/uL 0.3  - -   Baso # 0.00 - 0.20 K/uL 0.05 - -   Gran % 38.0 - 73.0 % 73.2(H) - -   Lymph % 18.0 - 48.0 % 13.4(L) - -   Mono% 4.0 - 15.0 % 9.2 - -   Eos % 0.0 - 8.0 % 3.2 - -   Baso % 0.0 - 1.9 % 0.6 - -           Hemoglobin A1C   Date Value Ref Range Status   10/13/2020 7.1 (H) 4.0 - 5.6 % Final     Comment:     ADA Screening Guidelines:  5.7-6.4%  Consistent with prediabetes  >or=6.5%  Consistent with diabetes  High levels of fetal hemoglobin interfere with the HbA1C  assay. Heterozygous hemoglobin variants (HbS, HgC, etc)do  not significantly interfere with this assay.   However, presence of multiple variants may affect accuracy.     06/12/2020 8.0 (H) 4.0 - 5.6 % Final     Comment:     ADA Screening Guidelines:  5.7-6.4%  Consistent with prediabetes  >or=6.5%  Consistent with diabetes  High levels of fetal hemoglobin interfere with the HbA1C  assay. Heterozygous hemoglobin variants (HbS, HgC, etc)do  not significantly interfere with this assay.   However, presence of multiple variants may affect accuracy.     01/17/2020 8.2 (H) 0.0 - 5.6 % Final     Comment:     Reference Interval:  5.0 - 5.6 Normal   5.7 - 6.4 High Risk   > 6.5 Diabetic    Hgb A1c results are standardized based on the (NGSP) National   Glycohemoglobin Standardization Program.    Hemoglobin A1C levels are related to mean serum/plasma glucose   during the preceding 2-3 months.              Assessment and plan:       Problem List Items Addressed This Visit        Cardiac/Vascular    Benign essential HTN    Current Assessment & Plan     BP controlled. Continue current meds              Endocrine    Type 2 diabetes mellitus with diabetic polyneuropathy, without long-term current use of insulin - Primary    Current Assessment & Plan     FBG at goal.  HbA1C 7.1% (near goal) previously 8%  Add metformin. Has some loose stool at baseline. Discussed potential to worsen sx's. Take with largest meal. Will give ER version. egfr >60. In the future could consider  adding SGLT-2 I if the more cost efficient metformin does not work for the pt.  Continue Januvia 100 mg and glimipiride  Alternate checking BG 2=3 x/wk before meals and bedtime.   Up to date with eye exam   Urine microalbumin wnls. On ARB.  Discussed proper foot care.  Counseled pt on low carb/low fat diet and to increase physical activity.  Not on statin per pt's choice. ? H/o intolerance. LDL 84.           Relevant Medications    metFORMIN (GLUCOPHAGE-XR) 500 MG ER 24hr tablet         A total of 25 minutes were spent face to face with pt discussing the previously mentioned diagnosis, counseling, and coordination of care.         RTC to clinic in 6 months with ANKIT Vasquez

## 2020-10-20 ENCOUNTER — OFFICE VISIT (OUTPATIENT)
Dept: ENDOCRINOLOGY | Facility: CLINIC | Age: 75
End: 2020-10-20
Payer: MEDICARE

## 2020-10-20 VITALS
DIASTOLIC BLOOD PRESSURE: 70 MMHG | BODY MASS INDEX: 28.01 KG/M2 | HEIGHT: 67 IN | SYSTOLIC BLOOD PRESSURE: 142 MMHG | HEART RATE: 76 BPM | WEIGHT: 178.44 LBS | OXYGEN SATURATION: 98 %

## 2020-10-20 DIAGNOSIS — E11.42 TYPE 2 DIABETES MELLITUS WITH DIABETIC POLYNEUROPATHY, WITHOUT LONG-TERM CURRENT USE OF INSULIN: Primary | ICD-10-CM

## 2020-10-20 DIAGNOSIS — I10 BENIGN ESSENTIAL HTN: ICD-10-CM

## 2020-10-20 PROCEDURE — 3077F PR MOST RECENT SYSTOLIC BLOOD PRESSURE >= 140 MM HG: ICD-10-PCS | Mod: CPTII,S$GLB,, | Performed by: INTERNAL MEDICINE

## 2020-10-20 PROCEDURE — 99999 PR PBB SHADOW E&M-EST. PATIENT-LVL IV: CPT | Mod: PBBFAC,,, | Performed by: INTERNAL MEDICINE

## 2020-10-20 PROCEDURE — 99214 PR OFFICE/OUTPT VISIT, EST, LEVL IV, 30-39 MIN: ICD-10-PCS | Mod: S$GLB,,, | Performed by: INTERNAL MEDICINE

## 2020-10-20 PROCEDURE — 1101F PT FALLS ASSESS-DOCD LE1/YR: CPT | Mod: CPTII,S$GLB,, | Performed by: INTERNAL MEDICINE

## 2020-10-20 PROCEDURE — 3078F PR MOST RECENT DIASTOLIC BLOOD PRESSURE < 80 MM HG: ICD-10-PCS | Mod: CPTII,S$GLB,, | Performed by: INTERNAL MEDICINE

## 2020-10-20 PROCEDURE — 3078F DIAST BP <80 MM HG: CPT | Mod: CPTII,S$GLB,, | Performed by: INTERNAL MEDICINE

## 2020-10-20 PROCEDURE — 1126F PR PAIN SEVERITY QUANTIFIED, NO PAIN PRESENT: ICD-10-PCS | Mod: S$GLB,,, | Performed by: INTERNAL MEDICINE

## 2020-10-20 PROCEDURE — 99999 PR PBB SHADOW E&M-EST. PATIENT-LVL IV: ICD-10-PCS | Mod: PBBFAC,,, | Performed by: INTERNAL MEDICINE

## 2020-10-20 PROCEDURE — 3051F HG A1C>EQUAL 7.0%<8.0%: CPT | Mod: CPTII,S$GLB,, | Performed by: INTERNAL MEDICINE

## 2020-10-20 PROCEDURE — 3077F SYST BP >= 140 MM HG: CPT | Mod: CPTII,S$GLB,, | Performed by: INTERNAL MEDICINE

## 2020-10-20 PROCEDURE — 3051F PR MOST RECENT HEMOGLOBIN A1C LEVEL 7.0 - < 8.0%: ICD-10-PCS | Mod: CPTII,S$GLB,, | Performed by: INTERNAL MEDICINE

## 2020-10-20 PROCEDURE — 1126F AMNT PAIN NOTED NONE PRSNT: CPT | Mod: S$GLB,,, | Performed by: INTERNAL MEDICINE

## 2020-10-20 PROCEDURE — 99214 OFFICE O/P EST MOD 30 MIN: CPT | Mod: S$GLB,,, | Performed by: INTERNAL MEDICINE

## 2020-10-20 PROCEDURE — 1159F PR MEDICATION LIST DOCUMENTED IN MEDICAL RECORD: ICD-10-PCS | Mod: S$GLB,,, | Performed by: INTERNAL MEDICINE

## 2020-10-20 PROCEDURE — 1159F MED LIST DOCD IN RCRD: CPT | Mod: S$GLB,,, | Performed by: INTERNAL MEDICINE

## 2020-10-20 PROCEDURE — 1101F PR PT FALLS ASSESS DOC 0-1 FALLS W/OUT INJ PAST YR: ICD-10-PCS | Mod: CPTII,S$GLB,, | Performed by: INTERNAL MEDICINE

## 2020-10-20 RX ORDER — METFORMIN HYDROCHLORIDE 500 MG/1
500 TABLET, EXTENDED RELEASE ORAL 2 TIMES DAILY WITH MEALS
Qty: 60 TABLET | Refills: 11 | Status: SHIPPED | OUTPATIENT
Start: 2020-10-20 | End: 2021-02-24

## 2020-10-20 NOTE — ASSESSMENT & PLAN NOTE
FBG at goal.  HbA1C 7.1% (near goal) previously 8%  Add metformin. Has some loose stool at baseline. Discussed potential to worsen sx's. Take with largest meal. Will give ER version. egfr >60. In the future could consider adding SGLT-2 I if the more cost efficient metformin does not work for the pt.  Continue Januvia 100 mg and glimipiride  Alternate checking BG 2=3 x/wk before meals and bedtime.   Up to date with eye exam   Urine microalbumin wnls. On ARB.  Discussed proper foot care.  Counseled pt on low carb/low fat diet and to increase physical activity.  Not on statin per pt's choice. ? H/o intolerance. LDL 84.

## 2020-11-10 ENCOUNTER — TELEPHONE (OUTPATIENT)
Dept: ENDOCRINOLOGY | Facility: CLINIC | Age: 75
End: 2020-11-10

## 2020-11-10 DIAGNOSIS — E11.42 TYPE 2 DIABETES MELLITUS WITH DIABETIC POLYNEUROPATHY, WITHOUT LONG-TERM CURRENT USE OF INSULIN: Primary | ICD-10-CM

## 2021-01-12 ENCOUNTER — IMMUNIZATION (OUTPATIENT)
Dept: FAMILY MEDICINE | Facility: CLINIC | Age: 76
End: 2021-01-12
Payer: MEDICARE

## 2021-01-12 DIAGNOSIS — Z23 NEED FOR VACCINATION: ICD-10-CM

## 2021-01-12 PROCEDURE — 91300 COVID-19, MRNA, LNP-S, PF, 30 MCG/0.3 ML DOSE VACCINE: CPT | Mod: PBBFAC | Performed by: FAMILY MEDICINE

## 2021-01-20 DIAGNOSIS — E11.8 TYPE 2 DIABETES MELLITUS WITH COMPLICATION, WITHOUT LONG-TERM CURRENT USE OF INSULIN: ICD-10-CM

## 2021-01-20 RX ORDER — GLIMEPIRIDE 1 MG/1
1 TABLET ORAL
Qty: 90 TABLET | Refills: 3 | Status: SHIPPED | OUTPATIENT
Start: 2021-01-20 | End: 2021-04-20 | Stop reason: ALTCHOICE

## 2021-01-26 PROBLEM — N63.25 MASS OVERLAPPING MULTIPLE QUADRANTS OF LEFT BREAST: Status: ACTIVE | Noted: 2021-01-26

## 2021-01-26 PROBLEM — N63.15 MASS OVERLAPPING MULTIPLE QUADRANTS OF RIGHT BREAST: Status: ACTIVE | Noted: 2021-01-26

## 2021-01-26 PROBLEM — Z12.31 VISIT FOR SCREENING MAMMOGRAM: Status: ACTIVE | Noted: 2021-01-26

## 2021-02-02 ENCOUNTER — IMMUNIZATION (OUTPATIENT)
Dept: FAMILY MEDICINE | Facility: CLINIC | Age: 76
End: 2021-02-02
Payer: MEDICARE

## 2021-02-02 DIAGNOSIS — Z23 NEED FOR VACCINATION: Primary | ICD-10-CM

## 2021-02-02 PROCEDURE — 91300 COVID-19, MRNA, LNP-S, PF, 30 MCG/0.3 ML DOSE VACCINE: CPT | Mod: PBBFAC | Performed by: FAMILY MEDICINE

## 2021-02-02 PROCEDURE — 0002A COVID-19, MRNA, LNP-S, PF, 30 MCG/0.3 ML DOSE VACCINE: CPT | Mod: PBBFAC | Performed by: FAMILY MEDICINE

## 2021-02-26 PROBLEM — G63 POLYNEUROPATHY ASSOCIATED WITH UNDERLYING DISEASE: Status: ACTIVE | Noted: 2021-02-26

## 2021-04-16 ENCOUNTER — LAB VISIT (OUTPATIENT)
Dept: LAB | Facility: HOSPITAL | Age: 76
End: 2021-04-16
Attending: NURSE PRACTITIONER
Payer: MEDICARE

## 2021-04-16 DIAGNOSIS — E11.42 TYPE 2 DIABETES MELLITUS WITH DIABETIC POLYNEUROPATHY, WITHOUT LONG-TERM CURRENT USE OF INSULIN: ICD-10-CM

## 2021-04-16 LAB
ESTIMATED AVG GLUCOSE: 131 MG/DL (ref 68–131)
HBA1C MFR BLD: 6.2 % (ref 4–5.6)

## 2021-04-16 PROCEDURE — 36415 COLL VENOUS BLD VENIPUNCTURE: CPT | Mod: PO | Performed by: NURSE PRACTITIONER

## 2021-04-16 PROCEDURE — 83036 HEMOGLOBIN GLYCOSYLATED A1C: CPT | Performed by: NURSE PRACTITIONER

## 2021-04-20 ENCOUNTER — OFFICE VISIT (OUTPATIENT)
Dept: ENDOCRINOLOGY | Facility: CLINIC | Age: 76
End: 2021-04-20
Payer: MEDICARE

## 2021-04-20 VITALS
BODY MASS INDEX: 27.18 KG/M2 | HEIGHT: 67 IN | SYSTOLIC BLOOD PRESSURE: 128 MMHG | WEIGHT: 173.19 LBS | HEART RATE: 75 BPM | DIASTOLIC BLOOD PRESSURE: 68 MMHG

## 2021-04-20 DIAGNOSIS — I10 BENIGN ESSENTIAL HTN: ICD-10-CM

## 2021-04-20 DIAGNOSIS — L84 CALLUS OF FOOT: ICD-10-CM

## 2021-04-20 DIAGNOSIS — E78.00 HYPERCHOLESTEROLEMIA: ICD-10-CM

## 2021-04-20 DIAGNOSIS — E11.42 TYPE 2 DIABETES MELLITUS WITH DIABETIC POLYNEUROPATHY, WITHOUT LONG-TERM CURRENT USE OF INSULIN: Primary | ICD-10-CM

## 2021-04-20 DIAGNOSIS — G63 POLYNEUROPATHY ASSOCIATED WITH UNDERLYING DISEASE: ICD-10-CM

## 2021-04-20 PROCEDURE — 1126F AMNT PAIN NOTED NONE PRSNT: CPT | Mod: S$GLB,,, | Performed by: NURSE PRACTITIONER

## 2021-04-20 PROCEDURE — 1101F PT FALLS ASSESS-DOCD LE1/YR: CPT | Mod: CPTII,S$GLB,, | Performed by: NURSE PRACTITIONER

## 2021-04-20 PROCEDURE — 99999 PR PBB SHADOW E&M-EST. PATIENT-LVL IV: ICD-10-PCS | Mod: PBBFAC,,, | Performed by: NURSE PRACTITIONER

## 2021-04-20 PROCEDURE — 3288F PR FALLS RISK ASSESSMENT DOCUMENTED: ICD-10-PCS | Mod: CPTII,S$GLB,, | Performed by: NURSE PRACTITIONER

## 2021-04-20 PROCEDURE — 99214 OFFICE O/P EST MOD 30 MIN: CPT | Mod: S$GLB,,, | Performed by: NURSE PRACTITIONER

## 2021-04-20 PROCEDURE — 1126F PR PAIN SEVERITY QUANTIFIED, NO PAIN PRESENT: ICD-10-PCS | Mod: S$GLB,,, | Performed by: NURSE PRACTITIONER

## 2021-04-20 PROCEDURE — 1101F PR PT FALLS ASSESS DOC 0-1 FALLS W/OUT INJ PAST YR: ICD-10-PCS | Mod: CPTII,S$GLB,, | Performed by: NURSE PRACTITIONER

## 2021-04-20 PROCEDURE — 99999 PR PBB SHADOW E&M-EST. PATIENT-LVL IV: CPT | Mod: PBBFAC,,, | Performed by: NURSE PRACTITIONER

## 2021-04-20 PROCEDURE — 3288F FALL RISK ASSESSMENT DOCD: CPT | Mod: CPTII,S$GLB,, | Performed by: NURSE PRACTITIONER

## 2021-04-20 PROCEDURE — 99214 PR OFFICE/OUTPT VISIT, EST, LEVL IV, 30-39 MIN: ICD-10-PCS | Mod: S$GLB,,, | Performed by: NURSE PRACTITIONER

## 2021-07-14 ENCOUNTER — TELEPHONE (OUTPATIENT)
Dept: ENDOCRINOLOGY | Facility: CLINIC | Age: 76
End: 2021-07-14

## 2021-07-16 DIAGNOSIS — E11.42 TYPE 2 DIABETES MELLITUS WITH DIABETIC POLYNEUROPATHY, WITHOUT LONG-TERM CURRENT USE OF INSULIN: Primary | ICD-10-CM

## 2021-07-16 RX ORDER — GLIMEPIRIDE 1 MG/1
0.5 TABLET ORAL
Qty: 45 TABLET | Refills: 3 | Status: SHIPPED | OUTPATIENT
Start: 2021-07-16 | End: 2022-03-02

## 2021-08-24 ENCOUNTER — OFFICE VISIT (OUTPATIENT)
Dept: OPTOMETRY | Facility: CLINIC | Age: 76
End: 2021-08-24
Payer: MEDICARE

## 2021-08-24 DIAGNOSIS — E11.9 DIABETES MELLITUS TYPE 2 WITHOUT RETINOPATHY: Primary | ICD-10-CM

## 2021-08-24 DIAGNOSIS — Z96.1 BILATERAL PSEUDOPHAKIA: ICD-10-CM

## 2021-08-24 DIAGNOSIS — Z13.5 GLAUCOMA SCREENING: ICD-10-CM

## 2021-08-24 DIAGNOSIS — H43.813 POSTERIOR VITREOUS DETACHMENT, BILATERAL: ICD-10-CM

## 2021-08-24 PROCEDURE — 1126F AMNT PAIN NOTED NONE PRSNT: CPT | Mod: CPTII,S$GLB,, | Performed by: OPTOMETRIST

## 2021-08-24 PROCEDURE — 1159F MED LIST DOCD IN RCRD: CPT | Mod: CPTII,S$GLB,, | Performed by: OPTOMETRIST

## 2021-08-24 PROCEDURE — 3288F PR FALLS RISK ASSESSMENT DOCUMENTED: ICD-10-PCS | Mod: CPTII,S$GLB,, | Performed by: OPTOMETRIST

## 2021-08-24 PROCEDURE — 99999 PR PBB SHADOW E&M-EST. PATIENT-LVL III: CPT | Mod: PBBFAC,,, | Performed by: OPTOMETRIST

## 2021-08-24 PROCEDURE — 99999 PR PBB SHADOW E&M-EST. PATIENT-LVL III: ICD-10-PCS | Mod: PBBFAC,,, | Performed by: OPTOMETRIST

## 2021-08-24 PROCEDURE — 92004 PR EYE EXAM, NEW PATIENT,COMPREHESV: ICD-10-PCS | Mod: S$GLB,,, | Performed by: OPTOMETRIST

## 2021-08-24 PROCEDURE — 1101F PT FALLS ASSESS-DOCD LE1/YR: CPT | Mod: CPTII,S$GLB,, | Performed by: OPTOMETRIST

## 2021-08-24 PROCEDURE — 92004 COMPRE OPH EXAM NEW PT 1/>: CPT | Mod: S$GLB,,, | Performed by: OPTOMETRIST

## 2021-08-24 PROCEDURE — 3288F FALL RISK ASSESSMENT DOCD: CPT | Mod: CPTII,S$GLB,, | Performed by: OPTOMETRIST

## 2021-08-24 PROCEDURE — 2023F DILAT RTA XM W/O RTNOPTHY: CPT | Mod: CPTII,S$GLB,, | Performed by: OPTOMETRIST

## 2021-08-24 PROCEDURE — 1159F PR MEDICATION LIST DOCUMENTED IN MEDICAL RECORD: ICD-10-PCS | Mod: CPTII,S$GLB,, | Performed by: OPTOMETRIST

## 2021-08-24 PROCEDURE — 1126F PR PAIN SEVERITY QUANTIFIED, NO PAIN PRESENT: ICD-10-PCS | Mod: CPTII,S$GLB,, | Performed by: OPTOMETRIST

## 2021-08-24 PROCEDURE — 2023F PR DILATED RETINAL EXAM W/O EVID OF RETINOPATHY: ICD-10-PCS | Mod: CPTII,S$GLB,, | Performed by: OPTOMETRIST

## 2021-08-24 PROCEDURE — 1101F PR PT FALLS ASSESS DOC 0-1 FALLS W/OUT INJ PAST YR: ICD-10-PCS | Mod: CPTII,S$GLB,, | Performed by: OPTOMETRIST

## 2021-09-14 DIAGNOSIS — E11.8 TYPE 2 DIABETES MELLITUS WITH COMPLICATION, WITHOUT LONG-TERM CURRENT USE OF INSULIN: ICD-10-CM

## 2021-10-12 DIAGNOSIS — M51.26 DISPLACEMENT OF LUMBAR INTERVERTEBRAL DISC WITHOUT MYELOPATHY: Primary | ICD-10-CM

## 2021-10-13 ENCOUNTER — LAB VISIT (OUTPATIENT)
Dept: LAB | Facility: HOSPITAL | Age: 76
End: 2021-10-13
Attending: NURSE PRACTITIONER
Payer: MEDICARE

## 2021-10-13 ENCOUNTER — TELEPHONE (OUTPATIENT)
Dept: ENDOCRINOLOGY | Facility: CLINIC | Age: 76
End: 2021-10-13

## 2021-10-13 DIAGNOSIS — E11.42 TYPE 2 DIABETES MELLITUS WITH DIABETIC POLYNEUROPATHY, WITHOUT LONG-TERM CURRENT USE OF INSULIN: ICD-10-CM

## 2021-10-13 LAB
ESTIMATED AVG GLUCOSE: 128 MG/DL (ref 68–131)
HBA1C MFR BLD: 6.1 % (ref 4–5.6)

## 2021-10-13 PROCEDURE — 36415 COLL VENOUS BLD VENIPUNCTURE: CPT | Mod: PO | Performed by: NURSE PRACTITIONER

## 2021-10-13 PROCEDURE — 83036 HEMOGLOBIN GLYCOSYLATED A1C: CPT | Performed by: NURSE PRACTITIONER

## 2021-10-22 ENCOUNTER — CLINICAL SUPPORT (OUTPATIENT)
Dept: REHABILITATION | Facility: HOSPITAL | Age: 76
End: 2021-10-22
Payer: MEDICARE

## 2021-10-22 DIAGNOSIS — M51.26 DISPLACEMENT OF LUMBAR INTERVERTEBRAL DISC WITHOUT MYELOPATHY: ICD-10-CM

## 2021-10-22 PROCEDURE — 97110 THERAPEUTIC EXERCISES: CPT | Mod: PO

## 2021-10-22 PROCEDURE — 97161 PT EVAL LOW COMPLEX 20 MIN: CPT | Mod: PO

## 2021-10-25 ENCOUNTER — CLINICAL SUPPORT (OUTPATIENT)
Dept: REHABILITATION | Facility: HOSPITAL | Age: 76
End: 2021-10-25
Payer: MEDICARE

## 2021-10-25 DIAGNOSIS — M51.26 DISPLACEMENT OF LUMBAR INTERVERTEBRAL DISC WITHOUT MYELOPATHY: Primary | ICD-10-CM

## 2021-10-25 PROCEDURE — 97110 THERAPEUTIC EXERCISES: CPT | Mod: PO,CQ

## 2021-10-28 ENCOUNTER — CLINICAL SUPPORT (OUTPATIENT)
Dept: REHABILITATION | Facility: HOSPITAL | Age: 76
End: 2021-10-28
Payer: MEDICARE

## 2021-10-28 DIAGNOSIS — M51.26 DISPLACEMENT OF LUMBAR INTERVERTEBRAL DISC WITHOUT MYELOPATHY: Primary | ICD-10-CM

## 2021-10-28 PROCEDURE — 97110 THERAPEUTIC EXERCISES: CPT | Mod: PO,CQ

## 2021-11-11 ENCOUNTER — CLINICAL SUPPORT (OUTPATIENT)
Dept: REHABILITATION | Facility: HOSPITAL | Age: 76
End: 2021-11-11
Payer: MEDICARE

## 2021-11-11 DIAGNOSIS — G89.29 CHRONIC HIP PAIN, BILATERAL: ICD-10-CM

## 2021-11-11 DIAGNOSIS — M25.552 CHRONIC HIP PAIN, BILATERAL: ICD-10-CM

## 2021-11-11 DIAGNOSIS — M25.551 CHRONIC HIP PAIN, BILATERAL: ICD-10-CM

## 2021-11-11 DIAGNOSIS — M54.50 CHRONIC MIDLINE LOW BACK PAIN WITHOUT SCIATICA: ICD-10-CM

## 2021-11-11 DIAGNOSIS — G89.29 CHRONIC MIDLINE LOW BACK PAIN WITHOUT SCIATICA: ICD-10-CM

## 2021-11-11 DIAGNOSIS — R26.9 ALTERED GAIT: ICD-10-CM

## 2021-11-11 PROCEDURE — 97140 MANUAL THERAPY 1/> REGIONS: CPT | Mod: PO

## 2021-11-11 PROCEDURE — 97112 NEUROMUSCULAR REEDUCATION: CPT | Mod: PO

## 2021-11-11 PROCEDURE — 97110 THERAPEUTIC EXERCISES: CPT | Mod: PO

## 2021-11-16 ENCOUNTER — CLINICAL SUPPORT (OUTPATIENT)
Dept: REHABILITATION | Facility: HOSPITAL | Age: 76
End: 2021-11-16
Payer: MEDICARE

## 2021-11-16 DIAGNOSIS — R26.9 ALTERED GAIT: ICD-10-CM

## 2021-11-16 DIAGNOSIS — M54.50 CHRONIC MIDLINE LOW BACK PAIN WITHOUT SCIATICA: ICD-10-CM

## 2021-11-16 DIAGNOSIS — G89.29 CHRONIC HIP PAIN, BILATERAL: ICD-10-CM

## 2021-11-16 DIAGNOSIS — M25.552 CHRONIC HIP PAIN, BILATERAL: ICD-10-CM

## 2021-11-16 DIAGNOSIS — G89.29 CHRONIC MIDLINE LOW BACK PAIN WITHOUT SCIATICA: ICD-10-CM

## 2021-11-16 DIAGNOSIS — M25.551 CHRONIC HIP PAIN, BILATERAL: ICD-10-CM

## 2021-11-16 PROCEDURE — 97110 THERAPEUTIC EXERCISES: CPT | Mod: KX,PO

## 2021-11-16 PROCEDURE — 97112 NEUROMUSCULAR REEDUCATION: CPT | Mod: KX,PO

## 2021-11-16 PROCEDURE — 97140 MANUAL THERAPY 1/> REGIONS: CPT | Mod: KX,PO

## 2021-11-17 ENCOUNTER — OFFICE VISIT (OUTPATIENT)
Dept: ENDOCRINOLOGY | Facility: CLINIC | Age: 76
End: 2021-11-17
Payer: MEDICARE

## 2021-11-17 VITALS
HEIGHT: 67 IN | HEART RATE: 103 BPM | SYSTOLIC BLOOD PRESSURE: 118 MMHG | BODY MASS INDEX: 26.37 KG/M2 | DIASTOLIC BLOOD PRESSURE: 60 MMHG | WEIGHT: 168 LBS

## 2021-11-17 DIAGNOSIS — E11.42 TYPE 2 DIABETES MELLITUS WITH DIABETIC POLYNEUROPATHY, WITHOUT LONG-TERM CURRENT USE OF INSULIN: Primary | ICD-10-CM

## 2021-11-17 DIAGNOSIS — G63 POLYNEUROPATHY ASSOCIATED WITH UNDERLYING DISEASE: ICD-10-CM

## 2021-11-17 DIAGNOSIS — E78.00 HYPERCHOLESTEROLEMIA: ICD-10-CM

## 2021-11-17 DIAGNOSIS — I10 BENIGN ESSENTIAL HTN: ICD-10-CM

## 2021-11-17 PROCEDURE — 3288F PR FALLS RISK ASSESSMENT DOCUMENTED: ICD-10-PCS | Mod: CPTII,S$GLB,, | Performed by: NURSE PRACTITIONER

## 2021-11-17 PROCEDURE — 1126F PR PAIN SEVERITY QUANTIFIED, NO PAIN PRESENT: ICD-10-PCS | Mod: CPTII,S$GLB,, | Performed by: NURSE PRACTITIONER

## 2021-11-17 PROCEDURE — 1159F MED LIST DOCD IN RCRD: CPT | Mod: CPTII,S$GLB,, | Performed by: NURSE PRACTITIONER

## 2021-11-17 PROCEDURE — 99214 OFFICE O/P EST MOD 30 MIN: CPT | Mod: S$GLB,,, | Performed by: NURSE PRACTITIONER

## 2021-11-17 PROCEDURE — 3078F DIAST BP <80 MM HG: CPT | Mod: CPTII,S$GLB,, | Performed by: NURSE PRACTITIONER

## 2021-11-17 PROCEDURE — 3288F FALL RISK ASSESSMENT DOCD: CPT | Mod: CPTII,S$GLB,, | Performed by: NURSE PRACTITIONER

## 2021-11-17 PROCEDURE — 1126F AMNT PAIN NOTED NONE PRSNT: CPT | Mod: CPTII,S$GLB,, | Performed by: NURSE PRACTITIONER

## 2021-11-17 PROCEDURE — 1159F PR MEDICATION LIST DOCUMENTED IN MEDICAL RECORD: ICD-10-PCS | Mod: CPTII,S$GLB,, | Performed by: NURSE PRACTITIONER

## 2021-11-17 PROCEDURE — 3078F PR MOST RECENT DIASTOLIC BLOOD PRESSURE < 80 MM HG: ICD-10-PCS | Mod: CPTII,S$GLB,, | Performed by: NURSE PRACTITIONER

## 2021-11-17 PROCEDURE — 99214 PR OFFICE/OUTPT VISIT, EST, LEVL IV, 30-39 MIN: ICD-10-PCS | Mod: S$GLB,,, | Performed by: NURSE PRACTITIONER

## 2021-11-17 PROCEDURE — 1101F PR PT FALLS ASSESS DOC 0-1 FALLS W/OUT INJ PAST YR: ICD-10-PCS | Mod: CPTII,S$GLB,, | Performed by: NURSE PRACTITIONER

## 2021-11-17 PROCEDURE — 3074F PR MOST RECENT SYSTOLIC BLOOD PRESSURE < 130 MM HG: ICD-10-PCS | Mod: CPTII,S$GLB,, | Performed by: NURSE PRACTITIONER

## 2021-11-17 PROCEDURE — 1101F PT FALLS ASSESS-DOCD LE1/YR: CPT | Mod: CPTII,S$GLB,, | Performed by: NURSE PRACTITIONER

## 2021-11-17 PROCEDURE — 3074F SYST BP LT 130 MM HG: CPT | Mod: CPTII,S$GLB,, | Performed by: NURSE PRACTITIONER

## 2021-11-17 PROCEDURE — 99999 PR PBB SHADOW E&M-EST. PATIENT-LVL III: ICD-10-PCS | Mod: PBBFAC,,, | Performed by: NURSE PRACTITIONER

## 2021-11-17 PROCEDURE — 99999 PR PBB SHADOW E&M-EST. PATIENT-LVL III: CPT | Mod: PBBFAC,,, | Performed by: NURSE PRACTITIONER

## 2021-11-30 ENCOUNTER — CLINICAL SUPPORT (OUTPATIENT)
Dept: REHABILITATION | Facility: HOSPITAL | Age: 76
End: 2021-11-30
Payer: MEDICARE

## 2021-11-30 DIAGNOSIS — M25.551 CHRONIC HIP PAIN, BILATERAL: ICD-10-CM

## 2021-11-30 DIAGNOSIS — G89.29 CHRONIC HIP PAIN, BILATERAL: ICD-10-CM

## 2021-11-30 DIAGNOSIS — M25.552 CHRONIC HIP PAIN, BILATERAL: ICD-10-CM

## 2021-11-30 DIAGNOSIS — G89.29 CHRONIC MIDLINE LOW BACK PAIN WITHOUT SCIATICA: ICD-10-CM

## 2021-11-30 DIAGNOSIS — R26.9 ALTERED GAIT: ICD-10-CM

## 2021-11-30 DIAGNOSIS — M54.50 CHRONIC MIDLINE LOW BACK PAIN WITHOUT SCIATICA: ICD-10-CM

## 2021-11-30 PROCEDURE — 97140 MANUAL THERAPY 1/> REGIONS: CPT | Mod: PO

## 2021-11-30 PROCEDURE — 97110 THERAPEUTIC EXERCISES: CPT | Mod: PO

## 2021-12-02 ENCOUNTER — CLINICAL SUPPORT (OUTPATIENT)
Dept: REHABILITATION | Facility: HOSPITAL | Age: 76
End: 2021-12-02
Payer: MEDICARE

## 2021-12-02 DIAGNOSIS — M25.552 CHRONIC HIP PAIN, BILATERAL: ICD-10-CM

## 2021-12-02 DIAGNOSIS — G89.29 CHRONIC HIP PAIN, BILATERAL: ICD-10-CM

## 2021-12-02 DIAGNOSIS — R26.9 ALTERED GAIT: ICD-10-CM

## 2021-12-02 DIAGNOSIS — M25.551 CHRONIC HIP PAIN, BILATERAL: ICD-10-CM

## 2021-12-02 DIAGNOSIS — M54.50 CHRONIC MIDLINE LOW BACK PAIN WITHOUT SCIATICA: ICD-10-CM

## 2021-12-02 DIAGNOSIS — G89.29 CHRONIC MIDLINE LOW BACK PAIN WITHOUT SCIATICA: ICD-10-CM

## 2021-12-02 PROCEDURE — 97140 MANUAL THERAPY 1/> REGIONS: CPT | Mod: PO

## 2021-12-02 PROCEDURE — 97110 THERAPEUTIC EXERCISES: CPT | Mod: PO

## 2021-12-06 ENCOUNTER — CLINICAL SUPPORT (OUTPATIENT)
Dept: REHABILITATION | Facility: HOSPITAL | Age: 76
End: 2021-12-06
Payer: MEDICARE

## 2021-12-06 DIAGNOSIS — M25.551 CHRONIC HIP PAIN, BILATERAL: ICD-10-CM

## 2021-12-06 DIAGNOSIS — R26.9 ALTERED GAIT: ICD-10-CM

## 2021-12-06 DIAGNOSIS — G89.29 CHRONIC MIDLINE LOW BACK PAIN WITHOUT SCIATICA: ICD-10-CM

## 2021-12-06 DIAGNOSIS — M54.50 CHRONIC MIDLINE LOW BACK PAIN WITHOUT SCIATICA: ICD-10-CM

## 2021-12-06 DIAGNOSIS — M25.552 CHRONIC HIP PAIN, BILATERAL: ICD-10-CM

## 2021-12-06 DIAGNOSIS — G89.29 CHRONIC HIP PAIN, BILATERAL: ICD-10-CM

## 2021-12-06 PROCEDURE — 97140 MANUAL THERAPY 1/> REGIONS: CPT | Mod: PO

## 2021-12-06 PROCEDURE — 97110 THERAPEUTIC EXERCISES: CPT | Mod: PO

## 2021-12-08 ENCOUNTER — CLINICAL SUPPORT (OUTPATIENT)
Dept: REHABILITATION | Facility: HOSPITAL | Age: 76
End: 2021-12-08
Payer: MEDICARE

## 2021-12-08 DIAGNOSIS — R26.9 ALTERED GAIT: ICD-10-CM

## 2021-12-08 DIAGNOSIS — M54.50 CHRONIC MIDLINE LOW BACK PAIN WITHOUT SCIATICA: ICD-10-CM

## 2021-12-08 DIAGNOSIS — G89.29 CHRONIC HIP PAIN, BILATERAL: ICD-10-CM

## 2021-12-08 DIAGNOSIS — M25.551 CHRONIC HIP PAIN, BILATERAL: ICD-10-CM

## 2021-12-08 DIAGNOSIS — G89.29 CHRONIC MIDLINE LOW BACK PAIN WITHOUT SCIATICA: ICD-10-CM

## 2021-12-08 DIAGNOSIS — M25.552 CHRONIC HIP PAIN, BILATERAL: ICD-10-CM

## 2021-12-08 PROCEDURE — 97140 MANUAL THERAPY 1/> REGIONS: CPT | Mod: PO

## 2021-12-08 PROCEDURE — 97110 THERAPEUTIC EXERCISES: CPT | Mod: PO

## 2021-12-14 ENCOUNTER — IMMUNIZATION (OUTPATIENT)
Dept: FAMILY MEDICINE | Facility: CLINIC | Age: 76
End: 2021-12-14
Payer: MEDICARE

## 2021-12-14 ENCOUNTER — CLINICAL SUPPORT (OUTPATIENT)
Dept: REHABILITATION | Facility: HOSPITAL | Age: 76
End: 2021-12-14
Payer: MEDICARE

## 2021-12-14 DIAGNOSIS — M54.50 CHRONIC MIDLINE LOW BACK PAIN WITHOUT SCIATICA: ICD-10-CM

## 2021-12-14 DIAGNOSIS — R26.9 ALTERED GAIT: ICD-10-CM

## 2021-12-14 DIAGNOSIS — M25.551 CHRONIC HIP PAIN, BILATERAL: ICD-10-CM

## 2021-12-14 DIAGNOSIS — M25.552 CHRONIC HIP PAIN, BILATERAL: ICD-10-CM

## 2021-12-14 DIAGNOSIS — Z23 NEED FOR VACCINATION: Primary | ICD-10-CM

## 2021-12-14 DIAGNOSIS — G89.29 CHRONIC HIP PAIN, BILATERAL: ICD-10-CM

## 2021-12-14 DIAGNOSIS — G89.29 CHRONIC MIDLINE LOW BACK PAIN WITHOUT SCIATICA: ICD-10-CM

## 2021-12-14 PROCEDURE — 0004A COVID-19, MRNA, LNP-S, PF, 30 MCG/0.3 ML DOSE VACCINE: CPT | Mod: PBBFAC | Performed by: RADIOLOGY

## 2021-12-14 PROCEDURE — 97110 THERAPEUTIC EXERCISES: CPT | Mod: KX,PO,CQ

## 2021-12-30 DIAGNOSIS — M51.26 OTHER INTERVERTEBRAL DISC DISPLACEMENT, LUMBAR REGION: ICD-10-CM

## 2021-12-30 DIAGNOSIS — M51.06 DISPLACEMENT OF LUMBAR INTERVERTEBRAL DISC WITH MYELOPATHY: Primary | ICD-10-CM

## 2022-02-18 ENCOUNTER — LAB VISIT (OUTPATIENT)
Dept: LAB | Facility: HOSPITAL | Age: 77
End: 2022-02-18
Attending: NURSE PRACTITIONER
Payer: MEDICARE

## 2022-02-18 DIAGNOSIS — E11.42 TYPE 2 DIABETES MELLITUS WITH DIABETIC POLYNEUROPATHY, WITHOUT LONG-TERM CURRENT USE OF INSULIN: ICD-10-CM

## 2022-02-18 LAB
ESTIMATED AVG GLUCOSE: 120 MG/DL (ref 68–131)
HBA1C MFR BLD: 5.8 % (ref 4–5.6)

## 2022-02-18 PROCEDURE — 83036 HEMOGLOBIN GLYCOSYLATED A1C: CPT | Performed by: NURSE PRACTITIONER

## 2022-02-18 PROCEDURE — 36415 COLL VENOUS BLD VENIPUNCTURE: CPT | Mod: PO | Performed by: NURSE PRACTITIONER

## 2022-02-25 PROBLEM — I70.0 TYPE 2 DIABETES MELLITUS WITH ATHEROSCLEROSIS OF AORTA: Chronic | Status: ACTIVE | Noted: 2019-05-02

## 2022-02-25 PROBLEM — E11.69 TYPE 2 DIABETES MELLITUS WITH HYPERCHOLESTEROLEMIA: Chronic | Status: ACTIVE | Noted: 2019-05-02

## 2022-02-25 PROBLEM — E11.51 TYPE 2 DIABETES MELLITUS WITH ATHEROSCLEROSIS OF AORTA: Chronic | Status: ACTIVE | Noted: 2019-05-02

## 2022-02-25 PROBLEM — E11.42 TYPE 2 DIABETES MELLITUS WITH DIABETIC POLYNEUROPATHY, WITHOUT LONG-TERM CURRENT USE OF INSULIN: Chronic | Status: ACTIVE | Noted: 2019-05-02

## 2022-02-25 PROBLEM — E78.00 TYPE 2 DIABETES MELLITUS WITH HYPERCHOLESTEROLEMIA: Chronic | Status: ACTIVE | Noted: 2019-05-02

## 2022-03-02 PROBLEM — R41.840 ATTENTION AND CONCENTRATION DEFICIT: Chronic | Status: ACTIVE | Noted: 2018-01-24

## 2022-03-02 PROBLEM — G63 POLYNEUROPATHY ASSOCIATED WITH UNDERLYING DISEASE: Chronic | Status: ACTIVE | Noted: 2021-02-26

## 2022-03-02 PROBLEM — I67.2 ATHEROSCLEROTIC CEREBROVASCULAR DISEASE: Chronic | Status: ACTIVE | Noted: 2017-08-01

## 2022-03-02 PROBLEM — F33.41 RECURRENT MAJOR DEPRESSIVE DISORDER, IN PARTIAL REMISSION: Chronic | Status: ACTIVE | Noted: 2020-09-10

## 2022-03-28 ENCOUNTER — OFFICE VISIT (OUTPATIENT)
Dept: NEUROLOGY | Facility: CLINIC | Age: 77
End: 2022-03-28
Payer: MEDICARE

## 2022-03-28 VITALS
HEART RATE: 86 BPM | DIASTOLIC BLOOD PRESSURE: 74 MMHG | WEIGHT: 150.5 LBS | BODY MASS INDEX: 24.19 KG/M2 | SYSTOLIC BLOOD PRESSURE: 162 MMHG | HEIGHT: 66 IN | RESPIRATION RATE: 18 BRPM

## 2022-03-28 DIAGNOSIS — R41.840 ATTENTION AND CONCENTRATION DEFICIT: Chronic | ICD-10-CM

## 2022-03-28 DIAGNOSIS — F33.41 RECURRENT MAJOR DEPRESSIVE DISORDER, IN PARTIAL REMISSION: Chronic | ICD-10-CM

## 2022-03-28 DIAGNOSIS — G60.9 HEREDITARY AND IDIOPATHIC NEUROPATHY: ICD-10-CM

## 2022-03-28 DIAGNOSIS — I67.2 ATHEROSCLEROTIC CEREBROVASCULAR DISEASE: Chronic | ICD-10-CM

## 2022-03-28 DIAGNOSIS — R41.3 IMPAIRMENT OF SHORT-TERM AND LONG-TERM MEMORY: Chronic | ICD-10-CM

## 2022-03-28 DIAGNOSIS — R41.3 MEMORY LOSS: Primary | ICD-10-CM

## 2022-03-28 PROCEDURE — 3288F FALL RISK ASSESSMENT DOCD: CPT | Mod: CPTII,S$GLB,, | Performed by: PSYCHIATRY & NEUROLOGY

## 2022-03-28 PROCEDURE — 3078F PR MOST RECENT DIASTOLIC BLOOD PRESSURE < 80 MM HG: ICD-10-PCS | Mod: CPTII,S$GLB,, | Performed by: PSYCHIATRY & NEUROLOGY

## 2022-03-28 PROCEDURE — 1100F PTFALLS ASSESS-DOCD GE2>/YR: CPT | Mod: CPTII,S$GLB,, | Performed by: PSYCHIATRY & NEUROLOGY

## 2022-03-28 PROCEDURE — 1159F MED LIST DOCD IN RCRD: CPT | Mod: CPTII,S$GLB,, | Performed by: PSYCHIATRY & NEUROLOGY

## 2022-03-28 PROCEDURE — 1100F PR PT FALLS ASSESS DOC 2+ FALLS/FALL W/INJURY/YR: ICD-10-PCS | Mod: CPTII,S$GLB,, | Performed by: PSYCHIATRY & NEUROLOGY

## 2022-03-28 PROCEDURE — 99999 PR PBB SHADOW E&M-EST. PATIENT-LVL IV: CPT | Mod: PBBFAC,,, | Performed by: PSYCHIATRY & NEUROLOGY

## 2022-03-28 PROCEDURE — 3077F SYST BP >= 140 MM HG: CPT | Mod: CPTII,S$GLB,, | Performed by: PSYCHIATRY & NEUROLOGY

## 2022-03-28 PROCEDURE — 99214 OFFICE O/P EST MOD 30 MIN: CPT | Mod: S$GLB,,, | Performed by: PSYCHIATRY & NEUROLOGY

## 2022-03-28 PROCEDURE — 3078F DIAST BP <80 MM HG: CPT | Mod: CPTII,S$GLB,, | Performed by: PSYCHIATRY & NEUROLOGY

## 2022-03-28 PROCEDURE — 1160F RVW MEDS BY RX/DR IN RCRD: CPT | Mod: CPTII,S$GLB,, | Performed by: PSYCHIATRY & NEUROLOGY

## 2022-03-28 PROCEDURE — 1160F PR REVIEW ALL MEDS BY PRESCRIBER/CLIN PHARMACIST DOCUMENTED: ICD-10-PCS | Mod: CPTII,S$GLB,, | Performed by: PSYCHIATRY & NEUROLOGY

## 2022-03-28 PROCEDURE — 99214 PR OFFICE/OUTPT VISIT, EST, LEVL IV, 30-39 MIN: ICD-10-PCS | Mod: S$GLB,,, | Performed by: PSYCHIATRY & NEUROLOGY

## 2022-03-28 PROCEDURE — 1126F AMNT PAIN NOTED NONE PRSNT: CPT | Mod: CPTII,S$GLB,, | Performed by: PSYCHIATRY & NEUROLOGY

## 2022-03-28 PROCEDURE — 3077F PR MOST RECENT SYSTOLIC BLOOD PRESSURE >= 140 MM HG: ICD-10-PCS | Mod: CPTII,S$GLB,, | Performed by: PSYCHIATRY & NEUROLOGY

## 2022-03-28 PROCEDURE — 3072F PR LOW RISK FOR RETINOPATHY: ICD-10-PCS | Mod: CPTII,S$GLB,, | Performed by: PSYCHIATRY & NEUROLOGY

## 2022-03-28 PROCEDURE — 3072F LOW RISK FOR RETINOPATHY: CPT | Mod: CPTII,S$GLB,, | Performed by: PSYCHIATRY & NEUROLOGY

## 2022-03-28 PROCEDURE — 1159F PR MEDICATION LIST DOCUMENTED IN MEDICAL RECORD: ICD-10-PCS | Mod: CPTII,S$GLB,, | Performed by: PSYCHIATRY & NEUROLOGY

## 2022-03-28 PROCEDURE — 3288F PR FALLS RISK ASSESSMENT DOCUMENTED: ICD-10-PCS | Mod: CPTII,S$GLB,, | Performed by: PSYCHIATRY & NEUROLOGY

## 2022-03-28 PROCEDURE — 1126F PR PAIN SEVERITY QUANTIFIED, NO PAIN PRESENT: ICD-10-PCS | Mod: CPTII,S$GLB,, | Performed by: PSYCHIATRY & NEUROLOGY

## 2022-03-28 PROCEDURE — 99999 PR PBB SHADOW E&M-EST. PATIENT-LVL IV: ICD-10-PCS | Mod: PBBFAC,,, | Performed by: PSYCHIATRY & NEUROLOGY

## 2022-03-28 RX ORDER — DONEPEZIL HYDROCHLORIDE 5 MG/1
5 TABLET, FILM COATED ORAL NIGHTLY
Qty: 30 TABLET | Refills: 11 | Status: SHIPPED | OUTPATIENT
Start: 2022-03-28 | End: 2023-04-21 | Stop reason: SDUPTHER

## 2022-03-28 NOTE — PROGRESS NOTES
Date: 3/28/2022    Patient ID: Milady Bright is a 77 y.o. female.    Chief Complaint: Memory Loss      History of Present Illness:  Ms. Bright is a 77 y.o. female who presents followup of memory loss. She was last seen in June 2020. MMSE was 29/30 at that time.      She was taking aricept 5 mg daily and tried to increase to 10 mg and her head got wobbly. She decreased back down to 5 mg nightly and was on that for a while but is no longer taking it. It is now no longer on her medication list. She had neuropsych initial interview in Sep 2020 but never showed up for the testing.     She is still working doing book-keeping, counting, etc. She finds it is so difficult to do. She finds her memory seems worse after stopping the aricept.     Allergies:  Review of patient's allergies indicates:   Allergen Reactions    Invokana [canagliflozin] Other (See Comments)     Diarrhea, yeast infections    Atorvastatin calcium Other (See Comments)    Fenofibrate nanocrystallized Other (See Comments)    Statins-hmg-coa reductase inhibitors      Leg cramps       Current Medications:  Current Outpatient Medications   Medication Sig Dispense Refill    amLODIPine (NORVASC) 10 MG tablet Take 0.5 tablets by mouth once daily.      aspirin (ECOTRIN) 81 MG EC tablet Take 81 mg by mouth once daily.      buPROPion (WELLBUTRIN XL) 150 MG TB24 tablet Take 150 mg by mouth once daily.      calcium-vitamin D3 (OS- + D3) 500 mg(1,250mg) -200 unit per tablet Take 1 tablet by mouth 2 (two) times daily with meals.      estradiol (ESTRACE) 0.01 % (0.1 mg/gram) vaginal cream Place 1 g vaginally twice a week.       hydroCHLOROthiazide (HYDRODIURIL) 25 MG tablet Take 25 mg by mouth daily as needed.      irbesartan (AVAPRO) 300 MG tablet TAKE 1 TABLET EVERY DAY 90 tablet 3    potassium chloride (MICRO-K) 10 MEQ CpSR TAKE 1 CAPSULE EVERY DAY 90 capsule 3    SITagliptin (JANUVIA) 25 MG Tab Take 1 tablet (25 mg total) by mouth once daily. 30  tablet 6    blood sugar diagnostic (ACCU-CHEK SMARTVIEW TEST STRIP) Strp INJECT 1 STRIP INTO THE SKIN 3 (THREE) TIMES DAILY. (Patient not taking: Reported on 3/28/2022) 100 strip 11    blood-glucose meter kit To check BG  daily, to use with insurance preferred meter 1 each 0    diphenhydrAMINE (BENADRYL) 25 mg capsule diphenhydramine 25 mg capsule   Take 1 capsule every day by oral route.      donepeziL (ARICEPT) 5 MG tablet Take 1 tablet (5 mg total) by mouth every evening. 30 tablet 11    FLUoxetine 10 MG capsule Take 1 capsule (10 mg total) by mouth once daily. For depression. (Patient not taking: Reported on 3/28/2022) 30 capsule 11    HYDROcodone-acetaminophen (NORCO) 7.5-325 mg per tablet Take 1 tablet by mouth every 6 (six) hours as needed for Pain.      lancets Misc To check BG daily, to use with insurance preferred meter (Patient not taking: Reported on 3/28/2022) 50 each 12    magnesium 30 mg Tab Take by mouth once.      nystatin (MYCOSTATIN) cream Apply topically 2 (two) times daily to itching skin (Patient not taking: Reported on 3/28/2022) 15 g 0    WELCHOL 625 mg tablet Take 625 mg by mouth 3 (three) times daily with meals.       No current facility-administered medications for this visit.       Past Medical History:  Past Medical History:   Diagnosis Date    Diabetes mellitus, type 2     Disorder of bone and cartilage, unspecified     Hyperlipidemia     Hypertension     Memory loss     Other hammer toe (acquired)     Polyp of ascending colon 05/19/2020    2 (7-8 mm) polyps   repeat in 3 years     Polyp of hepatic flexure of colon 05/19/2020    3 ( 3-5mm) polyps   repeat in 3 years     Scoliosis (and kyphoscoliosis), idiopathic     Symptomatic menopausal or female climacteric states     Unspecified hereditary and idiopathic peripheral neuropathy     Urinary hesitancy        Past Surgical History:  Past Surgical History:   Procedure Laterality Date    CATARACT EXTRACTION       "CHOLECYSTECTOMY      COLONOSCOPY  05/19/2020    repeat in 3 years     HYSTERECTOMY      NANCY       Family History:  family history includes Cancer in her maternal grandmother; Diabetes in her father and mother; Heart disease in her mother and paternal grandfather; Parkinsonism in her brother and father; Stroke in her brother.    Social History:   reports that she has never smoked. She has never used smokeless tobacco. She reports that she does not drink alcohol and does not use drugs.    Physical Exam:  Vitals:    03/28/22 0945   BP: (!) 162/74   Pulse: 86   Resp: 18   Weight: 68.3 kg (150 lb 8.5 oz)   Height: 5' 6" (1.676 m)   PainSc: 0-No pain     Body mass index is 24.3 kg/m².    Neurological Exam:  Mental status: Awake and alert. MMSE 29/30  Speech language: No dysarthria or aphasia on conversation  Cranial nerves: Face symmetric  Motor: Moves all extremities well  Coordination: No ataxia. No tremor.      MMSE 3/28/2022   What is the (year), (season), (date), (day), (month)? 5   Where are we (state), (country), (town or city), (hospital), (floor)? 5   Name 3 common objects (eg. "apple", "table", "charli"). Take 1 second to say each. Then ask the patient to repeat all 3. Give 1 point for each correct answer. Then repeat them until he/she learns all 3. Count trials and record. 3   Serial 7's backwards. Stop after 5 answers. (100,93,86,79,72) or alternatively  spell "WORLD" backwards. (D..L..R..O..W). The score is the number of letters in correct order. 5   Ask for the 3 common objects named earlier in the exam. Give 1 point for each correct answer. 3   Name a "pencil" and "watch." 2   Repeat the following: "No ifs, ands, or buts." 1   Follow a 3-stage command: "Take a paper in your right hand, fold it in half, & put it on the floor." 3   Read and obey the following: (see paper exam) 1   Write a sentence. 1   Copy the following design: (see paper exam) 0   Total MMSE Score 29   Some recent data might be hidden "       Data:  I have personally reviewed other provider's notes, labs, & imaging made available to me today.     Labs:  CBC:   Lab Results   Component Value Date    WBC 7.52 02/25/2022    HGB 13.2 02/25/2022    HCT 38.3 02/25/2022     02/25/2022     (H) 02/25/2022    RDW 12.8 02/25/2022     BMP:   Lab Results   Component Value Date     02/25/2022    K 4.3 02/25/2022     02/25/2022    CO2 24 02/25/2022    BUN 17 02/25/2022    CREATININE 0.71 02/25/2022     (H) 02/25/2022    CALCIUM 9.7 02/25/2022     LFTS;   Lab Results   Component Value Date    PROT 7.3 07/30/2021    ALBUMIN 4.3 07/30/2021    BILITOT 0.7 07/30/2021    AST 44 (H) 07/30/2021    ALKPHOS 80 07/30/2021    ALT 29 07/30/2021     COAGS: No results found for: INR, PROTIME, PTT  FLP:   Lab Results   Component Value Date    CHOL 165 02/25/2022    HDL 80 (H) 02/25/2022    LDLCALC 61.4 (L) 02/25/2022    TRIG 118 02/25/2022    CHOLHDL 48.5 02/25/2022       Imaging:  I have personally reviewed the imaging, MRI brain 2017 shows white matter changes.     Assessment and Plan:  Ms. Bright is a 77 y.o. female here for followup of memory concerns. She feels she has worsened after stopping aricept. Will restart it at 5 mg nightly. Her MMSE is stable at 29/30. Encouraged her to finish neuropsych testing but she would like to wait and see how she feels once restarting aricept. Will see her back in 6 months to reassess.     Memory loss    Other orders  -     donepeziL (ARICEPT) 5 MG tablet; Take 1 tablet (5 mg total) by mouth every evening.  Dispense: 30 tablet; Refill: 11      I spent a total of 36 minutes on the day of the visit.This includes face to face time and non-face to face time preparing to see the patient (eg, review of tests), Obtaining and/or reviewing separately obtained history, Documenting clinical information in the electronic or other health record, Independently interpreting results and communicating results to the  patient/family/caregiver, or Care coordination.

## 2022-05-10 ENCOUNTER — LAB VISIT (OUTPATIENT)
Dept: LAB | Facility: HOSPITAL | Age: 77
End: 2022-05-10
Attending: NURSE PRACTITIONER
Payer: MEDICARE

## 2022-05-10 DIAGNOSIS — E11.42 TYPE 2 DIABETES MELLITUS WITH DIABETIC POLYNEUROPATHY, WITHOUT LONG-TERM CURRENT USE OF INSULIN: ICD-10-CM

## 2022-05-10 LAB
ESTIMATED AVG GLUCOSE: 103 MG/DL (ref 68–131)
HBA1C MFR BLD: 5.2 % (ref 4–5.6)

## 2022-05-10 PROCEDURE — 83036 HEMOGLOBIN GLYCOSYLATED A1C: CPT | Performed by: NURSE PRACTITIONER

## 2022-05-10 PROCEDURE — 36415 COLL VENOUS BLD VENIPUNCTURE: CPT | Mod: PO | Performed by: NURSE PRACTITIONER

## 2022-05-17 ENCOUNTER — OFFICE VISIT (OUTPATIENT)
Dept: ENDOCRINOLOGY | Facility: CLINIC | Age: 77
End: 2022-05-17
Payer: MEDICARE

## 2022-05-17 VITALS
SYSTOLIC BLOOD PRESSURE: 122 MMHG | HEART RATE: 92 BPM | DIASTOLIC BLOOD PRESSURE: 64 MMHG | HEIGHT: 67 IN | WEIGHT: 148.69 LBS | BODY MASS INDEX: 23.34 KG/M2

## 2022-05-17 DIAGNOSIS — E78.00 HYPERCHOLESTEROLEMIA: ICD-10-CM

## 2022-05-17 DIAGNOSIS — I10 BENIGN ESSENTIAL HTN: ICD-10-CM

## 2022-05-17 DIAGNOSIS — E11.42 TYPE 2 DIABETES MELLITUS WITH DIABETIC POLYNEUROPATHY, WITHOUT LONG-TERM CURRENT USE OF INSULIN: Primary | ICD-10-CM

## 2022-05-17 DIAGNOSIS — G63 POLYNEUROPATHY ASSOCIATED WITH UNDERLYING DISEASE: ICD-10-CM

## 2022-05-17 DIAGNOSIS — R41.3 MEMORY LOSS: ICD-10-CM

## 2022-05-17 PROCEDURE — 99213 PR OFFICE/OUTPT VISIT, EST, LEVL III, 20-29 MIN: ICD-10-PCS | Mod: S$GLB,,, | Performed by: NURSE PRACTITIONER

## 2022-05-17 PROCEDURE — 99999 PR PBB SHADOW E&M-EST. PATIENT-LVL III: ICD-10-PCS | Mod: PBBFAC,,, | Performed by: NURSE PRACTITIONER

## 2022-05-17 PROCEDURE — 3288F FALL RISK ASSESSMENT DOCD: CPT | Mod: CPTII,S$GLB,, | Performed by: NURSE PRACTITIONER

## 2022-05-17 PROCEDURE — 1125F AMNT PAIN NOTED PAIN PRSNT: CPT | Mod: CPTII,S$GLB,, | Performed by: NURSE PRACTITIONER

## 2022-05-17 PROCEDURE — 1160F RVW MEDS BY RX/DR IN RCRD: CPT | Mod: CPTII,S$GLB,, | Performed by: NURSE PRACTITIONER

## 2022-05-17 PROCEDURE — 1100F PR PT FALLS ASSESS DOC 2+ FALLS/FALL W/INJURY/YR: ICD-10-PCS | Mod: CPTII,S$GLB,, | Performed by: NURSE PRACTITIONER

## 2022-05-17 PROCEDURE — 3072F LOW RISK FOR RETINOPATHY: CPT | Mod: CPTII,S$GLB,, | Performed by: NURSE PRACTITIONER

## 2022-05-17 PROCEDURE — 99999 PR PBB SHADOW E&M-EST. PATIENT-LVL III: CPT | Mod: PBBFAC,,, | Performed by: NURSE PRACTITIONER

## 2022-05-17 PROCEDURE — 1160F PR REVIEW ALL MEDS BY PRESCRIBER/CLIN PHARMACIST DOCUMENTED: ICD-10-PCS | Mod: CPTII,S$GLB,, | Performed by: NURSE PRACTITIONER

## 2022-05-17 PROCEDURE — 99213 OFFICE O/P EST LOW 20 MIN: CPT | Mod: S$GLB,,, | Performed by: NURSE PRACTITIONER

## 2022-05-17 PROCEDURE — 3072F PR LOW RISK FOR RETINOPATHY: ICD-10-PCS | Mod: CPTII,S$GLB,, | Performed by: NURSE PRACTITIONER

## 2022-05-17 PROCEDURE — 1125F PR PAIN SEVERITY QUANTIFIED, PAIN PRESENT: ICD-10-PCS | Mod: CPTII,S$GLB,, | Performed by: NURSE PRACTITIONER

## 2022-05-17 PROCEDURE — 3078F PR MOST RECENT DIASTOLIC BLOOD PRESSURE < 80 MM HG: ICD-10-PCS | Mod: CPTII,S$GLB,, | Performed by: NURSE PRACTITIONER

## 2022-05-17 PROCEDURE — 3074F PR MOST RECENT SYSTOLIC BLOOD PRESSURE < 130 MM HG: ICD-10-PCS | Mod: CPTII,S$GLB,, | Performed by: NURSE PRACTITIONER

## 2022-05-17 PROCEDURE — 3078F DIAST BP <80 MM HG: CPT | Mod: CPTII,S$GLB,, | Performed by: NURSE PRACTITIONER

## 2022-05-17 PROCEDURE — 1100F PTFALLS ASSESS-DOCD GE2>/YR: CPT | Mod: CPTII,S$GLB,, | Performed by: NURSE PRACTITIONER

## 2022-05-17 PROCEDURE — 3074F SYST BP LT 130 MM HG: CPT | Mod: CPTII,S$GLB,, | Performed by: NURSE PRACTITIONER

## 2022-05-17 PROCEDURE — 1159F MED LIST DOCD IN RCRD: CPT | Mod: CPTII,S$GLB,, | Performed by: NURSE PRACTITIONER

## 2022-05-17 PROCEDURE — 3288F PR FALLS RISK ASSESSMENT DOCUMENTED: ICD-10-PCS | Mod: CPTII,S$GLB,, | Performed by: NURSE PRACTITIONER

## 2022-05-17 PROCEDURE — 1159F PR MEDICATION LIST DOCUMENTED IN MEDICAL RECORD: ICD-10-PCS | Mod: CPTII,S$GLB,, | Performed by: NURSE PRACTITIONER

## 2022-09-24 PROBLEM — N63.15 MASS OVERLAPPING MULTIPLE QUADRANTS OF RIGHT BREAST: Chronic | Status: ACTIVE | Noted: 2021-01-26

## 2022-09-24 PROBLEM — D47.3 ESSENTIAL THROMBOCYTOSIS: Chronic | Status: ACTIVE | Noted: 2018-10-27

## 2022-10-18 ENCOUNTER — TELEPHONE (OUTPATIENT)
Dept: NEUROLOGY | Facility: CLINIC | Age: 77
End: 2022-10-18
Payer: MEDICARE

## 2022-10-18 NOTE — TELEPHONE ENCOUNTER
Left message to call in regards to the appt she has scheduled with Emily hallman on 11/15/22. Is it memory issues or ADD.

## 2022-11-10 ENCOUNTER — TELEPHONE (OUTPATIENT)
Dept: NEUROLOGY | Facility: CLINIC | Age: 77
End: 2022-11-10
Payer: MEDICARE

## 2022-11-14 ENCOUNTER — TELEPHONE (OUTPATIENT)
Dept: NEUROLOGY | Facility: CLINIC | Age: 77
End: 2022-11-14
Payer: MEDICARE

## 2023-04-21 DIAGNOSIS — R41.3 MEMORY LOSS: Primary | ICD-10-CM

## 2023-04-21 RX ORDER — DONEPEZIL HYDROCHLORIDE 5 MG/1
5 TABLET, FILM COATED ORAL NIGHTLY
Qty: 30 TABLET | Refills: 0 | Status: SHIPPED | OUTPATIENT
Start: 2023-04-21 | End: 2023-05-24 | Stop reason: SDUPTHER

## 2023-04-27 ENCOUNTER — TELEPHONE (OUTPATIENT)
Dept: NEUROLOGY | Facility: CLINIC | Age: 78
End: 2023-04-27
Payer: MEDICARE

## 2023-04-27 NOTE — TELEPHONE ENCOUNTER
Attempted to call patient to confirm appointment with MACHO Mendoza on . Location/Date/Time left on message.

## 2023-04-28 ENCOUNTER — LAB VISIT (OUTPATIENT)
Dept: LAB | Facility: HOSPITAL | Age: 78
End: 2023-04-28
Attending: NURSE PRACTITIONER
Payer: MEDICARE

## 2023-04-28 ENCOUNTER — OFFICE VISIT (OUTPATIENT)
Dept: NEUROLOGY | Facility: CLINIC | Age: 78
End: 2023-04-28
Payer: MEDICARE

## 2023-04-28 VITALS
HEIGHT: 67 IN | WEIGHT: 151.69 LBS | BODY MASS INDEX: 23.81 KG/M2 | SYSTOLIC BLOOD PRESSURE: 171 MMHG | DIASTOLIC BLOOD PRESSURE: 74 MMHG | HEART RATE: 80 BPM

## 2023-04-28 DIAGNOSIS — R25.1 TREMOR, UNSPECIFIED: ICD-10-CM

## 2023-04-28 LAB
ALBUMIN SERPL BCP-MCNC: 4.4 G/DL (ref 3.5–5.2)
ALP SERPL-CCNC: 112 U/L (ref 55–135)
ALT SERPL W/O P-5'-P-CCNC: 15 U/L (ref 10–44)
ANION GAP SERPL CALC-SCNC: 11 MMOL/L (ref 8–16)
AST SERPL-CCNC: 26 U/L (ref 10–40)
BASOPHILS # BLD AUTO: 0.02 K/UL (ref 0–0.2)
BASOPHILS NFR BLD: 0.3 % (ref 0–1.9)
BILIRUB SERPL-MCNC: 0.9 MG/DL (ref 0.1–1)
BUN SERPL-MCNC: 16 MG/DL (ref 8–23)
CALCIUM SERPL-MCNC: 9.9 MG/DL (ref 8.7–10.5)
CHLORIDE SERPL-SCNC: 103 MMOL/L (ref 95–110)
CO2 SERPL-SCNC: 27 MMOL/L (ref 23–29)
CREAT SERPL-MCNC: 1.2 MG/DL (ref 0.5–1.4)
DIFFERENTIAL METHOD: ABNORMAL
EOSINOPHIL # BLD AUTO: 0.1 K/UL (ref 0–0.5)
EOSINOPHIL NFR BLD: 0.8 % (ref 0–8)
ERYTHROCYTE [DISTWIDTH] IN BLOOD BY AUTOMATED COUNT: 12.7 % (ref 11.5–14.5)
EST. GFR  (NO RACE VARIABLE): 45.8 ML/MIN/1.73 M^2
GLUCOSE SERPL-MCNC: 177 MG/DL (ref 70–110)
HCT VFR BLD AUTO: 39.8 % (ref 37–48.5)
HGB BLD-MCNC: 13.7 G/DL (ref 12–16)
IMM GRANULOCYTES # BLD AUTO: 0.01 K/UL (ref 0–0.04)
IMM GRANULOCYTES NFR BLD AUTO: 0.2 % (ref 0–0.5)
LYMPHOCYTES # BLD AUTO: 0.8 K/UL (ref 1–4.8)
LYMPHOCYTES NFR BLD: 11.9 % (ref 18–48)
MCH RBC QN AUTO: 34.3 PG (ref 27–31)
MCHC RBC AUTO-ENTMCNC: 34.4 G/DL (ref 32–36)
MCV RBC AUTO: 100 FL (ref 82–98)
MONOCYTES # BLD AUTO: 0.7 K/UL (ref 0.3–1)
MONOCYTES NFR BLD: 10.2 % (ref 4–15)
NEUTROPHILS # BLD AUTO: 5 K/UL (ref 1.8–7.7)
NEUTROPHILS NFR BLD: 76.6 % (ref 38–73)
NRBC BLD-RTO: 0 /100 WBC
PLATELET # BLD AUTO: 282 K/UL (ref 150–450)
PMV BLD AUTO: 8.9 FL (ref 9.2–12.9)
POTASSIUM SERPL-SCNC: 4.6 MMOL/L (ref 3.5–5.1)
PROT SERPL-MCNC: 7.7 G/DL (ref 6–8.4)
RBC # BLD AUTO: 4 M/UL (ref 4–5.4)
SODIUM SERPL-SCNC: 141 MMOL/L (ref 136–145)
WBC # BLD AUTO: 6.54 K/UL (ref 3.9–12.7)

## 2023-04-28 PROCEDURE — 3078F PR MOST RECENT DIASTOLIC BLOOD PRESSURE < 80 MM HG: ICD-10-PCS | Mod: CPTII,S$GLB,, | Performed by: NURSE PRACTITIONER

## 2023-04-28 PROCEDURE — 3077F PR MOST RECENT SYSTOLIC BLOOD PRESSURE >= 140 MM HG: ICD-10-PCS | Mod: CPTII,S$GLB,, | Performed by: NURSE PRACTITIONER

## 2023-04-28 PROCEDURE — 82525 ASSAY OF COPPER: CPT | Performed by: NURSE PRACTITIONER

## 2023-04-28 PROCEDURE — 36415 COLL VENOUS BLD VENIPUNCTURE: CPT | Mod: PO | Performed by: NURSE PRACTITIONER

## 2023-04-28 PROCEDURE — 3078F DIAST BP <80 MM HG: CPT | Mod: CPTII,S$GLB,, | Performed by: NURSE PRACTITIONER

## 2023-04-28 PROCEDURE — 99215 OFFICE O/P EST HI 40 MIN: CPT | Mod: S$GLB,,, | Performed by: NURSE PRACTITIONER

## 2023-04-28 PROCEDURE — 1160F PR REVIEW ALL MEDS BY PRESCRIBER/CLIN PHARMACIST DOCUMENTED: ICD-10-PCS | Mod: CPTII,S$GLB,, | Performed by: NURSE PRACTITIONER

## 2023-04-28 PROCEDURE — 80053 COMPREHEN METABOLIC PANEL: CPT | Performed by: NURSE PRACTITIONER

## 2023-04-28 PROCEDURE — 99999 PR PBB SHADOW E&M-EST. PATIENT-LVL V: CPT | Mod: PBBFAC,,, | Performed by: NURSE PRACTITIONER

## 2023-04-28 PROCEDURE — 99215 PR OFFICE/OUTPT VISIT, EST, LEVL V, 40-54 MIN: ICD-10-PCS | Mod: S$GLB,,, | Performed by: NURSE PRACTITIONER

## 2023-04-28 PROCEDURE — 1159F PR MEDICATION LIST DOCUMENTED IN MEDICAL RECORD: ICD-10-PCS | Mod: CPTII,S$GLB,, | Performed by: NURSE PRACTITIONER

## 2023-04-28 PROCEDURE — 1159F MED LIST DOCD IN RCRD: CPT | Mod: CPTII,S$GLB,, | Performed by: NURSE PRACTITIONER

## 2023-04-28 PROCEDURE — 82300 ASSAY OF CADMIUM: CPT | Performed by: NURSE PRACTITIONER

## 2023-04-28 PROCEDURE — 1160F RVW MEDS BY RX/DR IN RCRD: CPT | Mod: CPTII,S$GLB,, | Performed by: NURSE PRACTITIONER

## 2023-04-28 PROCEDURE — 1126F AMNT PAIN NOTED NONE PRSNT: CPT | Mod: CPTII,S$GLB,, | Performed by: NURSE PRACTITIONER

## 2023-04-28 PROCEDURE — 1126F PR PAIN SEVERITY QUANTIFIED, NO PAIN PRESENT: ICD-10-PCS | Mod: CPTII,S$GLB,, | Performed by: NURSE PRACTITIONER

## 2023-04-28 PROCEDURE — 99999 PR PBB SHADOW E&M-EST. PATIENT-LVL V: ICD-10-PCS | Mod: PBBFAC,,, | Performed by: NURSE PRACTITIONER

## 2023-04-28 PROCEDURE — 3077F SYST BP >= 140 MM HG: CPT | Mod: CPTII,S$GLB,, | Performed by: NURSE PRACTITIONER

## 2023-04-28 PROCEDURE — 85025 COMPLETE CBC W/AUTO DIFF WBC: CPT | Performed by: NURSE PRACTITIONER

## 2023-04-28 RX ORDER — METFORMIN HYDROCHLORIDE 500 MG/1
500 TABLET ORAL 2 TIMES DAILY WITH MEALS
COMMUNITY
End: 2023-11-08 | Stop reason: CLARIF

## 2023-04-28 RX ORDER — HYDROCODONE BITARTRATE AND ACETAMINOPHEN 7.5; 325 MG/1; MG/1
TABLET ORAL
COMMUNITY
Start: 2023-03-29 | End: 2023-05-23

## 2023-04-28 RX ORDER — PROPRANOLOL HYDROCHLORIDE 20 MG/1
20 TABLET ORAL 2 TIMES DAILY
Qty: 60 TABLET | Refills: 2 | Status: SHIPPED | OUTPATIENT
Start: 2023-04-28 | End: 2023-07-23 | Stop reason: SDUPTHER

## 2023-04-28 NOTE — PROGRESS NOTES
NEUROLOGY  Outpatient Follow Up    [facility]  [address] [facility], [place] [address]  [phone] (office) / [phone] (fax)    Patient Name:  Milady Bright  :  1945  MR #:  [id number]  Acct #:  [id number]    Date of Neurology Visit: 2023  Name of Provider: GIORGIO Mendoza    Other Physicians:  TOMASA Ellis Jr, MD (Primary Care Physician); Noreen Fang NP (Referring)      Chief Complaint: Tremors      History of Present Illness (HPI):  Patient is a 79 y/o female with a PMXH of HTN, DM, HLD, Memory loss, Scoliosis        Interval Hx 2023:   Patient is new to me. She has been evaluated by Dr. Reyes in the past for memory loss.    Patient is here today for tremors. It occurs to her entire body only when standing. She denies associated symptoms like lightheadedness, LOC, dizziness, visual or speech disturbances.   These events started last Friday and has only occurred one other time. It ocurrs mainly when standing and relieved when sitting down. She is able to walk and move freely during that time.  Daughter here with the patient seems to have noticed tremor episodes on more occasions. She and daughter state she is a very nervous person. She is maintained on Wellbutrin and Fluoxetine but does not believe it offers much aid.   She does endorse problems with her memory. 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 This has stressed her out as she is forgetting work functions and numbers.   She denies recent falls, back/neck pain, or focal weakness.         Past Medical, Surgical, Family & Social History:   Reviewed and updated.    Home Medications:     Current Outpatient Medications:     amLODIPine (NORVASC) 10 MG tablet, Take 0.5 tablets by mouth once daily., Disp: , Rfl:     aspirin (ECOTRIN) 81 MG EC tablet, Take 81 mg by mouth once daily., Disp: , Rfl:     buPROPion (WELLBUTRIN XL) 150 MG TB24 tablet, Take 1 tablet (150 mg total) by mouth once daily.,  Disp: 90 tablet, Rfl: 1    calcium carb/mag carb/folic ac (MAGNESIUM-CALCIUM-FOLIC ACID ORAL), Take 1,000 mcg by mouth., Disp: , Rfl:     calcium-vitamin D3 (OS- + D3) 500 mg(1,250mg) -200 unit per tablet, Take 1 tablet by mouth 2 (two) times daily with meals., Disp: , Rfl:     diphenhydrAMINE (BENADRYL) 25 mg capsule, diphenhydramine 25 mg capsule  Take 1 capsule every day by oral route., Disp: , Rfl:     estradiol (ESTRACE) 0.01 % (0.1 mg/gram) vaginal cream, Place 1 g vaginally twice a week. , Disp: , Rfl:     FLUoxetine 10 MG capsule, Take 1 capsule (10 mg total) by mouth once daily. For depression., Disp: 30 capsule, Rfl: 6    hydroCHLOROthiazide (MICROZIDE) 12.5 mg capsule, Take 1 capsule (12.5 mg total) by mouth every morning., Disp: 90 capsule, Rfl: 3    irbesartan (AVAPRO) 300 MG tablet, TAKE 1 TABLET EVERY DAY, Disp: 90 tablet, Rfl: 3    metFORMIN (GLUCOPHAGE) 500 MG tablet, Take 500 mg by mouth 2 (two) times daily with meals., Disp: , Rfl:     nutritional supplements Liqd, Take by mouth., Disp: , Rfl:     potassium chloride (MICRO-K) 10 MEQ CpSR, Take 1 capsule (10 mEq total) by mouth once daily., Disp: 90 capsule, Rfl: 3    bisacodyL (DULCOLAX) 5 mg EC tablet, As directed on prep sheet (Patient not taking: Reported on 2/7/2023), Disp: 4 tablet, Rfl: 0    blood sugar diagnostic (ACCU-CHEK SMARTVIEW TEST STRIP) Strp, INJECT 1 STRIP INTO THE SKIN 3 (THREE) TIMES DAILY. (Patient not taking: Reported on 9/28/2022), Disp: 100 strip, Rfl: 11    donepeziL (ARICEPT) 5 MG tablet, Take 1 tablet (5 mg total) by mouth every evening. (Patient not taking: Reported on 4/28/2023), Disp: 30 tablet, Rfl: 0    HYDROcodone-acetaminophen (NORCO) 7.5-325 mg per tablet, Take by mouth., Disp: , Rfl:     ketoconazole (NIZORAL) 2 % shampoo, WASH TOPICALLY TO THE SCALP 3 TIMES A WEEK. LEAVE ON FOR 5 MINUTES BEFORE RINSING, Disp: , Rfl:     lancets Misc, To check BG daily, to use with insurance preferred meter (Patient not  "taking: Reported on 9/28/2022), Disp: 50 each, Rfl: 12    polyethylene glycol (MIRALAX) 17 gram/dose powder, As directed on prep sheet. (Patient not taking: Reported on 2/7/2023), Disp: 510 g, Rfl: 0    promethazine (PHENERGAN) 25 MG tablet, Take one tablet by mouth every 4 to 6 hours as needed for nausea (Patient not taking: Reported on 2/7/2023), Disp: 5 tablet, Rfl: 0    propranoloL (INDERAL) 20 MG tablet, Take 1 tablet (20 mg total) by mouth 2 (two) times daily., Disp: 60 tablet, Rfl: 2    SITagliptin phosphate (JANUVIA) 100 MG Tab, Take by mouth once daily., Disp: , Rfl:     UNABLE TO FIND, CBD Drops, Disp: , Rfl:     Physical Examination:  BP (!) 171/74 (BP Location: Right arm, Patient Position: Sitting, BP Method: Medium (Automatic))   Pulse 80   Ht 5' 7" (1.702 m)   Wt 68.8 kg (151 lb 10.8 oz)   BMI 23.76 kg/m²     GENERAL:  General appearance: Well, non-toxic appearing.  No apparent distress.  Neck: supple.    MENTAL STATUS:  Alertness, attention span & concentration: normal.  Language: normal.  Orientation to self, place & time:  normal.  Memory, recent & remote: normal.  Fund of knowledge: normal.  MMSE: 29/30 (2022)    SPEECH:  Clear and fluent.  Follows complex commands.    CRANIAL NERVES:  Cranial Nerves II-XII were examined.  II - Visual fields: normal.  III, IV, VI: PERRL, EOMI, No ptosis, No nystagmus.  V - Facial sensation: normal.  VII - Face symmetry & mobility: normal.  VIII - Hearing: normal  IX, X - Palate: mobile & midline.  XI - Shoulder shrug: normal.  XII - Tongue protrusion: normal.      GROSS MOTOR:  Gait & station: non focal; mildly stooped; reduced arm swing to right arm; good step height; 2 step turn  Tone: unable to relax  Arms extended: no tremor  Arms to core: no tremor  Abnormal movements: none.  Finger-nose: mild bilateral ET  Heel-knee-shin: normal.  Rapid alternating movements: normal.  Pronator drift: normal      MUSCLE STRENGTH:   Hand grasp:   - right:5/5   - " left:5/5    RIGHT    LEFT   5 Neck Ext. 5   5 Neck Flex 5   5 Deltoids 5   5 Biceps 5   5 Triceps 5   5 Forearm.Pr. 5        5 Iliopsoas flex    5   5 Hip Abduct 5   5 Hip Adduct 5   5 Quads 5   5 Hams 5   5 Dorsiflex 5   5 Plantar Flex 5     REFLEXES:    RIGHT Reflex   LEFT   2+ Biceps 2+   2+ Brachiorad. 2+        2+ Patellar 2+     SENSORY:  Light touch: Normal throughout.             Diagnostic Data Reviewed:     Folate   Date Value Ref Range Status   10/14/2014 13.5 4.0 - 24.0 ng/mL Final     Vitamin B-12   Date Value Ref Range Status   02/09/2023 986 (H) 210 - 950 pg/mL Final     Comment:     Patients taking very high Biotin doses of >300 mcg/day may   cause a positive bias in this assay.       TSH   Date Value Ref Range Status   02/09/2023 2.510 0.400 - 4.000 uIU/mL Final     Comment:     Warning:  Heterophilic antibodies in serum or plasma of   certain individuals are known to cause interference with   immunoassays. These antibodies may be present in blood samples   from individuals regularly exposed to animal or who have been   treated with animal products.     Patients taking very high Biotin doses of >300 mcg/day may   cause a negative bias in this assay.       RPR   Date Value Ref Range Status   10/14/2014 Non-reactive Non-reactive Final     Thiamine   Date Value Ref Range Status   10/14/2014 71 38 - 122 ug/L Final     Comment:     Test performed at [facility],  [address] [place] [address]   [phone]  Ken Mendez MD  - Med. Director       Ammonia   Date Value Ref Range Status   10/14/2014 27 10 - 50 umol/L Final                      Assessment and Plan:    Problem List Items Addressed This Visit          Neuro    Tremor, unspecified    Current Assessment & Plan     Patient is a 77 y/o female that presents for tremors. She reports feeling tremors throughout her entire body that mainly occurs when standing. Onset ~ 1 week ago and has only occurred a handful of times.    There is no associated dizziness, lightheadedness, seizure-like activity, speech/visual disturbances.   Neuro exam with mild tremor on FTN testing. She appears tremulous at times and states she is a very nervous person.   No PD features on exam. I was not able to reproduce her reported symptoms on exam today.   Etiology unknown but possibly due to underlying mood or medication s/e   - taking Fluoxetine and Wellbutrin which both can exacerbate tremor  Obtain labs  Of note she states these meds do not offer aid in depression/anxiety   - consider Zoloft. Will defer to her PCP for management  Discussed gold standard medications for ET   - start low dose trial of Propranolol 20 mg BID; S/E discussed  Pt to discuss other mood meds with her PCP in the meantime                            Important to note, also  has a past medical history of Diabetes mellitus, type 2, Disorder of bone and cartilage, unspecified, Hyperlipidemia, Hypertension, Memory loss, Other hammer toe (acquired), Polyp of ascending colon (05/19/2020), Polyp of hepatic flexure of colon (05/19/2020), Scoliosis (and kyphoscoliosis), idiopathic, Symptomatic menopausal or female climacteric states, Unspecified hereditary and idiopathic peripheral neuropathy, and Urinary hesitancy.          The patient will return to clinic in 4-6 weeks    All questions were answered and patient is comfortable with the plan.         Thank you very much for the opportunity to assist in this patient's care.    If you have any questions or concerns, please do not hesitate to contact me at any time.      Sincerely,     GIORGIO Mendoza  [facility] [place]         I spent a total of 65 minutes on the day of the visit.This includes face to face time and non-face to face time preparing to see the patient (eg, review of tests), Obtaining and/or reviewing separately obtained history, Documenting clinical information in the electronic or other health  record, Independently interpreting resultsand communicating results to the patient/family/caregiver, or Care coordination.

## 2023-04-28 NOTE — ASSESSMENT & PLAN NOTE
Patient is a 77 y/o female that presents for tremors. She reports feeling tremors throughout her entire body that mainly occurs when standing. Onset ~ 1 week ago and has only occurred a handful of times.   There is no associated dizziness, lightheadedness, seizure-like activity, speech/visual disturbances.   Neuro exam with mild tremor on FTN testing. She appears tremulous at times and states she is a very nervous person.   No PD features on exam. I was not able to reproduce her reported symptoms on exam today.   Etiology unknown but possibly due to underlying mood or medication s/e   - taking Fluoxetine and Wellbutrin which both can exacerbate tremor  Obtain labs  Of note she states these meds do not offer aid in depression/anxiety   - consider Zoloft. Will defer to her PCP for management  Discussed gold standard medications for ET   - start low dose trial of Propranolol 20 mg BID; S/E discussed  Pt to discuss other mood meds with her PCP in the meantime

## 2023-05-01 LAB
ARSENIC BLD-MCNC: <1 NG/ML
CADMIUM BLD-MCNC: 0.6 NG/ML
CITY: NORMAL
COUNTY: NORMAL
GUARDIAN FIRST NAME: NORMAL
GUARDIAN LAST NAME: NORMAL
HOME PHONE: NORMAL
LEAD BLD-MCNC: 3.8 MCG/DL
MERCURY BLD-MCNC: <1 NG/ML
RACE: NORMAL
STATE: NORMAL
STREET ADDRESS: NORMAL
VENOUS/CAPILLARY: NORMAL
ZIP: NORMAL

## 2023-05-03 LAB — COPPER SERPL-MCNC: 1054 UG/L (ref 810–1990)

## 2023-05-24 DIAGNOSIS — R41.3 MEMORY LOSS: ICD-10-CM

## 2023-05-24 RX ORDER — DONEPEZIL HYDROCHLORIDE 5 MG/1
5 TABLET, FILM COATED ORAL NIGHTLY
Qty: 30 TABLET | Refills: 0 | Status: CANCELLED | OUTPATIENT
Start: 2023-05-24 | End: 2024-05-23

## 2023-05-24 RX ORDER — DONEPEZIL HYDROCHLORIDE 5 MG/1
5 TABLET, FILM COATED ORAL NIGHTLY
Qty: 30 TABLET | Refills: 0 | Status: SHIPPED | OUTPATIENT
Start: 2023-05-24 | End: 2023-06-30

## 2023-07-23 DIAGNOSIS — R25.1 TREMOR, UNSPECIFIED: ICD-10-CM

## 2023-07-24 RX ORDER — PROPRANOLOL HYDROCHLORIDE 20 MG/1
20 TABLET ORAL 2 TIMES DAILY
Qty: 60 TABLET | Refills: 2 | Status: SHIPPED | OUTPATIENT
Start: 2023-07-24 | End: 2023-10-23 | Stop reason: SDUPTHER

## 2023-08-09 PROBLEM — T46.6X5A STATIN MYOPATHY: Chronic | Status: ACTIVE | Noted: 2023-08-09

## 2023-08-09 PROBLEM — G72.0 STATIN MYOPATHY: Chronic | Status: ACTIVE | Noted: 2023-08-09

## 2023-08-18 ENCOUNTER — TELEPHONE (OUTPATIENT)
Dept: AUDIOLOGY | Facility: CLINIC | Age: 78
End: 2023-08-18
Payer: MEDICARE

## 2023-08-18 NOTE — TELEPHONE ENCOUNTER
KOSTASOV asking patient to call us back so we can get her scheduled.    ----- Message from Yuli Ruiz sent at 8/18/2023  3:41 PM CDT -----  Contact: Patient  Type:  Needs Medical Advice    Who Called: Patient     Symptoms (please be specific): Hearing issues     Would the patient rather a call back or a response via [facility]? Call    Best Call Back Number: [phone] (home)     Additional Information: Patient states that she thought that an appt was supposed to be scheduled with the clinic a couple of months ago. Patient would like to speak to the clinic regarding hearing issues and her previous appt.

## 2023-10-02 ENCOUNTER — OFFICE VISIT (OUTPATIENT)
Dept: OPTOMETRY | Facility: CLINIC | Age: 78
End: 2023-10-02
Payer: MEDICARE

## 2023-10-02 DIAGNOSIS — H43.813 POSTERIOR VITREOUS DETACHMENT, BILATERAL: ICD-10-CM

## 2023-10-02 DIAGNOSIS — Z13.5 GLAUCOMA SCREENING: ICD-10-CM

## 2023-10-02 DIAGNOSIS — I70.0 TYPE 2 DIABETES MELLITUS WITH ATHEROSCLEROSIS OF AORTA: ICD-10-CM

## 2023-10-02 DIAGNOSIS — Z96.1 BILATERAL PSEUDOPHAKIA: ICD-10-CM

## 2023-10-02 DIAGNOSIS — E11.51 TYPE 2 DIABETES MELLITUS WITH ATHEROSCLEROSIS OF AORTA: ICD-10-CM

## 2023-10-02 DIAGNOSIS — E11.9 DIABETES MELLITUS TYPE 2 WITHOUT RETINOPATHY: Primary | ICD-10-CM

## 2023-10-02 PROCEDURE — 2023F PR DILATED RETINAL EXAM W/O EVID OF RETINOPATHY: ICD-10-PCS | Mod: CPTII,S$GLB,, | Performed by: OPTOMETRIST

## 2023-10-02 PROCEDURE — 1160F RVW MEDS BY RX/DR IN RCRD: CPT | Mod: CPTII,S$GLB,, | Performed by: OPTOMETRIST

## 2023-10-02 PROCEDURE — 99999 PR PBB SHADOW E&M-EST. PATIENT-LVL III: ICD-10-PCS | Mod: PBBFAC,,, | Performed by: OPTOMETRIST

## 2023-10-02 PROCEDURE — 1100F PTFALLS ASSESS-DOCD GE2>/YR: CPT | Mod: CPTII,S$GLB,, | Performed by: OPTOMETRIST

## 2023-10-02 PROCEDURE — 92014 COMPRE OPH EXAM EST PT 1/>: CPT | Mod: S$GLB,,, | Performed by: OPTOMETRIST

## 2023-10-02 PROCEDURE — 1126F PR PAIN SEVERITY QUANTIFIED, NO PAIN PRESENT: ICD-10-PCS | Mod: CPTII,S$GLB,, | Performed by: OPTOMETRIST

## 2023-10-02 PROCEDURE — 3288F PR FALLS RISK ASSESSMENT DOCUMENTED: ICD-10-PCS | Mod: CPTII,S$GLB,, | Performed by: OPTOMETRIST

## 2023-10-02 PROCEDURE — 1159F PR MEDICATION LIST DOCUMENTED IN MEDICAL RECORD: ICD-10-PCS | Mod: CPTII,S$GLB,, | Performed by: OPTOMETRIST

## 2023-10-02 PROCEDURE — 2023F DILAT RTA XM W/O RTNOPTHY: CPT | Mod: CPTII,S$GLB,, | Performed by: OPTOMETRIST

## 2023-10-02 PROCEDURE — 1160F PR REVIEW ALL MEDS BY PRESCRIBER/CLIN PHARMACIST DOCUMENTED: ICD-10-PCS | Mod: CPTII,S$GLB,, | Performed by: OPTOMETRIST

## 2023-10-02 PROCEDURE — 3288F FALL RISK ASSESSMENT DOCD: CPT | Mod: CPTII,S$GLB,, | Performed by: OPTOMETRIST

## 2023-10-02 PROCEDURE — 92014 PR EYE EXAM, EST PATIENT,COMPREHESV: ICD-10-PCS | Mod: S$GLB,,, | Performed by: OPTOMETRIST

## 2023-10-02 PROCEDURE — 1126F AMNT PAIN NOTED NONE PRSNT: CPT | Mod: CPTII,S$GLB,, | Performed by: OPTOMETRIST

## 2023-10-02 PROCEDURE — 99999 PR PBB SHADOW E&M-EST. PATIENT-LVL III: CPT | Mod: PBBFAC,,, | Performed by: OPTOMETRIST

## 2023-10-02 PROCEDURE — 1159F MED LIST DOCD IN RCRD: CPT | Mod: CPTII,S$GLB,, | Performed by: OPTOMETRIST

## 2023-10-02 PROCEDURE — 1100F PR PT FALLS ASSESS DOC 2+ FALLS/FALL W/INJURY/YR: ICD-10-PCS | Mod: CPTII,S$GLB,, | Performed by: OPTOMETRIST

## 2023-10-02 NOTE — PROGRESS NOTES
HPI    Pt presents for annual DM exam     States she believes her DM has come back. Complains of decrease in vision   OU     No ocular meds     Hemoglobin A1C       Date                     Value               Ref Range             Status                08/08/2023               6.9 (H)             0.0 - 5.6 %           Final                     02/09/2023               5.3                 0.0 - 5.6 %           Final                     09/23/2022               5.2                 0.0 - 5.6 %           Final                Last edited by Gayathri Hector on 10/2/2023  2:38 PM.            Assessment /Plan     For exam results, see Encounter Report.    Diabetes mellitus type 2 without retinopathy    Type 2 diabetes mellitus with atherosclerosis of aorta  -     Ambulatory referral/consult to Ophthalmology    Posterior vitreous detachment, bilateral    Glaucoma screening    Bilateral pseudophakia      1,2.  No demarco/ no csme, gave Diabetic Retinopathy info, advise tight control glucose, BP---Advise annual dilated fundus exam  3.    RD precautions given and reviewed. Patient knows to call/ message if any further changes in symptoms occur.  4.    Low suspect w/ moderate c/d, and high normal IOP, angles open, neg fhx glaucoma  --- monitor all   5.    Stable OU     Ok continue with otc only for near   Demo phoropter for distance ---pt defers Rx     Discussed and educated patient on current findings /plan.  RTC 1 year, prn if any changes / issues

## 2023-10-02 NOTE — PATIENT INSTRUCTIONS
"DRY EYES -- BURNING OR KALYAN SYMPTOMS:  Use Over The Counter artificial tears as needed for dry eye symptoms.   Some common brands include:  Systane, Optive, Refresh, and Thera-Tears.  These drops can be used as frequently as desired, but may be most helpful use during long periods of concentrated work.  For example, reading / working at the computer. Start with 3-4x per day.     Nighttime Ophthalmic gel or ointments are available: Refresh PM, Genteal, and Lacrilube.    Avoid drops that "get redness out" (Visine, Murine, Clear Eyes), as these may contain medication that could further irritate the eyes, especially with chronic use.    ALLERGY EYES -- ITCHING SYMPTOMS:  Over the counter medications include--Pataday, Zaditor, and Alaway.  Use as directed 1-2 drops daily for symptoms of itching / watering eyes.  These drops will not help for dry eye or exposure symptoms.    REDNESS RELIEF:  Lumify---is a good redness reliever that will not cause irritation if used chronically.        DIABETES AND THE EYE / DIABETIC RETINOPATHY    Diabetic retinopathy is a condition occurring in persons with diabetes, which causes progressive damage to the retina, the light sensitive lining at the back of the eye. It is a serious sight-threatening complication of diabetes.    Diabetic retinopathy is the result of damage to the tiny blood vessels that nourish the retina. They leak blood and other fluids that cause swelling of retinal tissue and clouding of vision. The condition usually affects both eyes. The longer a person has diabetes, the more likely they will develop diabetic retinopathy. If left untreated, diabetic retinopathy can cause blindness.  There are two basic types of diabetic retinopathy:    Background or nonproliferative diabetic retinopathy (NPDR)  Nonproliferative diabetic retinopathy (NPDR) is the earliest stage of diabetic retinopathy. With this condition, damaged blood vessels in the retina begin to leak extra fluid " and small amounts of blood into the eye. Sometimes, deposits of cholesterol or other fats from the blood may leak into the retina. Many people with diabetes have mild NPDR, which usually does not affect their vision. However, if their vision is affected, it is the result of macular edema and macular ischemia.    If vision is affected due to macular changes, a consult with a Retina Specialist may be advised.  This is an ophthalmologist that treats retina conditions, including diabetic retinopathy.     Proliferative diabetic retinopathy (PDR)  Proliferative diabetic retinopathy (PDR) mainly occurs when many of the blood vessels in the retina close, preventing enough blood flow. In an attempt to supply blood to the area where the original vessels closed, the retina responds by growing new blood vessels. This is called neovascularization. However, these new blood vessels are abnormal and do not supply the retina with proper blood flow. The new vessels are also often accompanied by scar tissue that may cause the retina to wrinkle or detach. PDR may cause more severe vision loss than NPDR because it can affect both central and peripheral vision.     A patient diagnosed with proliferative diabetic eye disease will be referred to a retinal specialist for consultation.    Often there are no visual symptoms in the early stages of diabetic retinopathy. That is why our eye care professionals recommend that everyone with diabetes have a comprehensive dilated eye examination once a year. Early detection and treatment can limit the potential for significant vision loss from diabetic retinopathy.       FLASHES / FLOATERS / POSTERIOR VITREOUS DETACHMENT    Call the clinic if you have any further changes in symptoms.  Including:  Increased numbers of floaters or flashing lights, dimness or darkness that moves through or stays constant in your vision, or any pain in the eye (s).    You may sometimes see small specks or clouds moving  in your field of vision.  They are called FLOATERS.  You can often see them when looking at a plain background, like a blank wall or blue sweta.  Floaters are actually tiny clumps of gel or cells inside the VITREOUS, the clear jelly-like fluid that fills the inside of your eye.    While these objects look like they are in front of your eye, they are actually floating inside.  What you see are the shadows they cast on the RETINA, the nerve layer at the back of the eye that senses light and allows you to see.      POSTERIOR VITREOUS DETACHMENT    The appearance of new floaters may be alarming.  If you suddenly develop new floaters, you should contact your eye care professional  right away.    The retina can tear if the shrinking vitreous pulls away from the wall of the eye.  This sometimes causes a small amount of bleeding in the eye that may appear as new floaters.    A torn retina is always a serious problem, since it can lead to a retinal detachment.  You should see your eye care professional as soon as possible if:    even one new floater appears suddenly;  you see sudden flashes of light;  you notice other symptoms, like the loss of side vision, or a curtain closes down in your vision        POSTERIOR VITREOUS DETACHMENT is more common for people who:    are nearsighted;  have had cataract surgery;  have had YAG laser surgery of the eye;  have had inflammation inside the eye;  are over age 60.      While some floaters may remain visible, many of them will fade over time and become less noticeable.  Even if you've had some floaters for years, you should have your eyes checked as soon as possible if you notice new ones.    FLASHING LIGHTS    When the vitreous gel rubs or pulls on the retina, you may see what look like flashing lights or lightning streaks.  These flashes can appear off and on for several weeks or months.      Some people experience flashes of light that appear as jagged lines or heat waves in both  eyes, lasting 10-20 minutes.  These flashes are caused by a spasm of blood vessels in the brain, which is called a migraine.    If a headache follows these flashes, it's called a migraine headache.  If   no headache occurs, these flashes are called Ophthalmic or Ocular Migraine.

## 2023-10-23 ENCOUNTER — OFFICE VISIT (OUTPATIENT)
Dept: URGENT CARE | Facility: CLINIC | Age: 78
End: 2023-10-23
Payer: MEDICARE

## 2023-10-23 VITALS
TEMPERATURE: 98 F | HEIGHT: 67 IN | DIASTOLIC BLOOD PRESSURE: 85 MMHG | OXYGEN SATURATION: 96 % | HEART RATE: 75 BPM | WEIGHT: 150 LBS | BODY MASS INDEX: 23.54 KG/M2 | SYSTOLIC BLOOD PRESSURE: 130 MMHG | RESPIRATION RATE: 16 BRPM

## 2023-10-23 DIAGNOSIS — R35.0 URINARY FREQUENCY: ICD-10-CM

## 2023-10-23 DIAGNOSIS — R82.90 FOUL SMELLING URINE: Primary | ICD-10-CM

## 2023-10-23 DIAGNOSIS — B37.0 CANDIDA INFECTION OF MOUTH: ICD-10-CM

## 2023-10-23 DIAGNOSIS — N30.01 ACUTE CYSTITIS WITH HEMATURIA: ICD-10-CM

## 2023-10-23 DIAGNOSIS — R25.1 TREMOR, UNSPECIFIED: ICD-10-CM

## 2023-10-23 DIAGNOSIS — K13.79 MOUTH SORES: ICD-10-CM

## 2023-10-23 DIAGNOSIS — K13.79 MOUTH PAIN: ICD-10-CM

## 2023-10-23 LAB
BILIRUB UR QL STRIP: POSITIVE
GLUCOSE UR QL STRIP: POSITIVE
KETONES UR QL STRIP: NEGATIVE
LEUKOCYTE ESTERASE UR QL STRIP: POSITIVE
PH, POC UA: 5 (ref 5–8)
POC BLOOD, URINE: POSITIVE
POC NITRATES, URINE: NEGATIVE
PROT UR QL STRIP: POSITIVE
SP GR UR STRIP: 1.02 (ref 1–1.03)
UROBILINOGEN UR STRIP-ACNC: NORMAL (ref 0.1–1.1)

## 2023-10-23 PROCEDURE — 99213 PR OFFICE/OUTPT VISIT, EST, LEVL III, 20-29 MIN: ICD-10-PCS | Mod: S$GLB,,, | Performed by: PHYSICIAN ASSISTANT

## 2023-10-23 PROCEDURE — 81003 URINALYSIS AUTO W/O SCOPE: CPT | Mod: QW,S$GLB,, | Performed by: PHYSICIAN ASSISTANT

## 2023-10-23 PROCEDURE — 87086 URINE CULTURE/COLONY COUNT: CPT | Performed by: PHYSICIAN ASSISTANT

## 2023-10-23 PROCEDURE — 99213 OFFICE O/P EST LOW 20 MIN: CPT | Mod: S$GLB,,, | Performed by: PHYSICIAN ASSISTANT

## 2023-10-23 PROCEDURE — 81003 POCT URINALYSIS, DIPSTICK, AUTOMATED, W/O SCOPE: ICD-10-PCS | Mod: QW,S$GLB,, | Performed by: PHYSICIAN ASSISTANT

## 2023-10-23 RX ORDER — PROPRANOLOL HYDROCHLORIDE 20 MG/1
20 TABLET ORAL 2 TIMES DAILY
Qty: 60 TABLET | Refills: 2 | Status: SHIPPED | OUTPATIENT
Start: 2023-10-23 | End: 2024-10-22

## 2023-10-23 RX ORDER — LIDOCAINE HYDROCHLORIDE 20 MG/ML
SOLUTION OROPHARYNGEAL
Qty: 100 ML | Refills: 0 | Status: SHIPPED | OUTPATIENT
Start: 2023-10-23 | End: 2023-11-08 | Stop reason: CLARIF

## 2023-10-23 RX ORDER — CEPHALEXIN 500 MG/1
500 CAPSULE ORAL EVERY 12 HOURS
Qty: 14 CAPSULE | Refills: 0 | Status: SHIPPED | OUTPATIENT
Start: 2023-10-23 | End: 2023-10-30

## 2023-10-23 RX ORDER — FLUCONAZOLE 150 MG/1
150 TABLET ORAL DAILY
Qty: 1 TABLET | Refills: 0 | Status: SHIPPED | OUTPATIENT
Start: 2023-10-23 | End: 2023-10-24

## 2023-10-23 NOTE — PATIENT INSTRUCTIONS
UTI Relief   - Increase water intake.  - Decrease sodas, tea, coffee and energy drinks until symptoms resolve.  - Cranberry products are thought to protect against UTI by inhibiting bacterial growth and adhesion to the bladder.  - Tylenol (acetaminophen) and/or NSAIDS (motrin, ibuprofen) as needed for discomfort.  - Timed voiding every 2-3 hours ( void every 2-3 hours even if you do not feel the urge).  - OTC AZO/pyridium if symptoms are persistent - this will turn your urine red/orange. This will help with symptomatic relief, but will not treat the infection.  - Avoid tight fitting cloths, douching, tampon use.  - Wipe front to back.   - Void prior to and after intercourse.   - Use probiotics especially with any antibiotic therapy.  Patient aware and verbalized understanding.     If you were prescribed a narcotic or controlled medication, do not drive or operate heavy equipment or machinery while taking these medications.  You must understand that you've received an Urgent Care treatment only and that you may be released before all your medical problems are known or treated. You, the patient, will arrange for follow up care as instructed.  Follow up with your PCP or specialty clinic as directed if not improved or as needed. You can call [phone] to schedule an appointment with the appropriate provider.  If your condition worsens we recommend that you receive another evaluation at the Emergency Department for any concerns or worsening of condition.  Patient aware and verbalized understanding.

## 2023-10-23 NOTE — PROGRESS NOTES
"Subjective:      Patient ID: Milady Bright is a 78 y.o. female.    Vitals:  height is 5' 7" (1.702 m) and weight is 68 kg (150 lb). Her oral temperature is 98.3 °F (36.8 °C). Her blood pressure is 130/85 and her pulse is 75. Her respiration is 16 and oxygen saturation is 96%.     Chief Complaint: Dental Pain    Pt presents to urgent care with pain and burning in the mouth.  She states that the pain is now moving to her throat.  Symptoms started Friday. The pain is moving. She was given something at an urgent care to put on his but she states that it is not working with the pain.  Pt also having urinary frequency and daughter states increased confusion.    Dental Pain   This is a new problem. The current episode started in the past 7 days. The problem occurs constantly. The problem has been unchanged. The pain is at a severity of 4/10. The pain is moderate. Pertinent negatives include no fever. She has tried nothing for the symptoms. The treatment provided no relief.       Constitution: Negative for chills, sweating, fatigue and fever.   HENT:  Positive for mouth sores. Negative for ear pain, drooling, congestion, sore throat, trouble swallowing and voice change.    Neck: Negative for neck pain, neck stiffness, painful lymph nodes and neck swelling.   Cardiovascular:  Negative for chest pain, leg swelling, palpitations, sob on exertion and passing out.   Eyes:  Negative for eye pain, eye redness, photophobia, double vision, blurred vision and eyelid swelling.   Respiratory:  Negative for chest tightness, cough, sputum production, bloody sputum, shortness of breath, stridor and wheezing.    Gastrointestinal:  Positive for constipation. Negative for abdominal pain, abdominal bloating, nausea, vomiting, diarrhea and heartburn.   Genitourinary:  Positive for frequency and urgency.   Musculoskeletal:  Negative for joint pain, joint swelling, abnormal ROM of joint, back pain, muscle cramps and muscle ache.   Skin:  " Negative for rash and hives.   Allergic/Immunologic: Negative for seasonal allergies, food allergies, hives, itching and sneezing.   Neurological:  Negative for dizziness, light-headedness, passing out, loss of balance, headaches, altered mental status, loss of consciousness and seizures.   Hematologic/Lymphatic: Negative for swollen lymph nodes.   Psychiatric/Behavioral:  Negative for altered mental status and nervous/anxious. The patient is not nervous/anxious.       Objective:     Physical Exam   Constitutional: She is oriented to person, place, and time. She appears well-developed. She is cooperative.  Non-toxic appearance. She does not appear ill. No distress.   HENT:   Head: Normocephalic and atraumatic.   Ears:   Right Ear: External ear normal.   Left Ear: External ear normal.   Nose: Nose normal. No nasal deformity. No epistaxis.   Mouth/Throat: Uvula is midline, oropharynx is clear and moist and mucous membranes are normal.   Eyes: Conjunctivae and lids are normal. No scleral icterus.   Neck: Trachea normal and phonation normal. Neck supple. No edema present. No erythema present. No neck rigidity present.   Cardiovascular: Normal rate, regular rhythm, normal heart sounds and normal pulses.   Pulmonary/Chest: Effort normal and breath sounds normal. No accessory muscle usage or stridor. No respiratory distress. She has no decreased breath sounds. She has no wheezes. She has no rhonchi. She has no rales.   Abdominal: Normal appearance and bowel sounds are normal. Soft. There is no abdominal tenderness. There is no rebound, no guarding, no left CVA tenderness and no right CVA tenderness.   Musculoskeletal: Normal range of motion.         General: No deformity. Normal range of motion.   Neurological: She is alert and oriented to person, place, and time. She exhibits normal muscle tone. Coordination normal.   Skin: Skin is warm, dry, intact, not diaphoretic, not pale and no rash. Capillary refill takes less than  2 seconds.   Psychiatric: Her speech is normal and behavior is normal. Judgment and thought content normal.   Nursing note and vitals reviewed.      Assessment:     1. Foul smelling urine    2. Urinary frequency    3. Mouth pain    4. Mouth sores    5. Candida infection of mouth    6. Acute cystitis with hematuria        Plan:   Start metamusil and probiotic daily for constipation and bowel movements    Foul smelling urine    Urinary frequency  -     POCT Urinalysis, Dipstick, Automated, W/O Scope    Mouth pain    Mouth sores    Candida infection of mouth    Acute cystitis with hematuria  -     CULTURE, URINE    Other orders  -     fluconazole (DIFLUCAN) 150 MG Tab; Take 1 tablet (150 mg total) by mouth once daily. for 1 day  Dispense: 1 tablet; Refill: 0  -     LIDOcaine HCl 2% (XYLOCAINE) 2 % Soln; 2 teaspoons to swish and spit every 4 hours  Dispense: 100 mL; Refill: 0  -     cephALEXin (KEFLEX) 500 MG capsule; Take 1 capsule (500 mg total) by mouth every 12 (twelve) hours. for 7 days  Dispense: 14 capsule; Refill: 0      Patient Instructions   UTI Relief   - Increase water intake.  - Decrease sodas, tea, coffee and energy drinks until symptoms resolve.  - Cranberry products are thought to protect against UTI by inhibiting bacterial growth and adhesion to the bladder.  - Tylenol (acetaminophen) and/or NSAIDS (motrin, ibuprofen) as needed for discomfort.  - Timed voiding every 2-3 hours ( void every 2-3 hours even if you do not feel the urge).  - OTC AZO/pyridium if symptoms are persistent - this will turn your urine red/orange. This will help with symptomatic relief, but will not treat the infection.  - Avoid tight fitting cloths, douching, tampon use.  - Wipe front to back.   - Void prior to and after intercourse.   - Use probiotics especially with any antibiotic therapy.  Patient aware and verbalized understanding.     If you were prescribed a narcotic or controlled medication, do not drive or operate heavy  equipment or machinery while taking these medications.  You must understand that you've received an Urgent Care treatment only and that you may be released before all your medical problems are known or treated. You, the patient, will arrange for follow up care as instructed.  Follow up with your PCP or specialty clinic as directed if not improved or as needed. You can call [phone] to schedule an appointment with the appropriate provider.  If your condition worsens we recommend that you receive another evaluation at the Emergency Department for any concerns or worsening of condition.  Patient aware and verbalized understanding.

## 2023-10-24 LAB
BACTERIA UR CULT: NORMAL
BACTERIA UR CULT: NORMAL

## 2023-10-26 ENCOUNTER — TELEPHONE (OUTPATIENT)
Dept: URGENT CARE | Facility: CLINIC | Age: 78
End: 2023-10-26
Payer: MEDICARE

## 2023-10-26 NOTE — TELEPHONE ENCOUNTER
1/3 attempts LVM    ----- Message from Jose Barron MD sent at 10/25/2023  4:05 PM CDT -----  Please contact the patient and let them know that their results showed multiple organisms. See how feeling- may need to return for repeat if not well.

## 2023-10-27 ENCOUNTER — TELEPHONE (OUTPATIENT)
Dept: URGENT CARE | Facility: CLINIC | Age: 78
End: 2023-10-27
Payer: MEDICARE

## 2023-10-29 PROBLEM — R25.1 TREMOR, UNSPECIFIED: Chronic | Status: ACTIVE | Noted: 2023-04-28

## 2023-10-30 ENCOUNTER — TELEPHONE (OUTPATIENT)
Dept: URGENT CARE | Facility: CLINIC | Age: 78
End: 2023-10-30
Payer: MEDICARE

## 2023-10-30 NOTE — TELEPHONE ENCOUNTER
-3/3 attempts LV has not viewred Mychart    ---- Message from Jose Barron MD sent at 10/25/2023  4:05 PM CDT -----  Please contact the patient and let them know that their results showed multiple organisms. See how feeling- may need to return for repeat if not well.

## 2023-11-02 PROBLEM — Z79.82 ASPIRIN LONG-TERM USE: Chronic | Status: ACTIVE | Noted: 2019-05-02

## 2023-11-08 PROBLEM — G93.41 ENCEPHALOPATHY, METABOLIC: Status: ACTIVE | Noted: 2023-11-08

## 2023-11-08 PROBLEM — R74.01 TRANSAMINITIS: Status: ACTIVE | Noted: 2023-11-08

## 2023-11-08 PROBLEM — E86.1 INTRAVASCULAR VOLUME DEPLETION: Status: ACTIVE | Noted: 2023-11-08

## 2023-11-08 PROBLEM — G25.71 AKATHISIA: Status: ACTIVE | Noted: 2023-11-08

## 2023-11-09 PROBLEM — E87.20 METABOLIC ACIDOSIS: Status: ACTIVE | Noted: 2023-11-09
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
## 2023-11-10 PROBLEM — R54 AGE-RELATED PHYSICAL DEBILITY: Status: ACTIVE | Noted: 2023-11-09

## 2023-11-10 PROBLEM — E83.42 HYPOMAGNESEMIA: Status: ACTIVE | Noted: 2023-11-10

## 2023-11-11 PROBLEM — G92.8 TOXIC METABOLIC ENCEPHALOPATHY: Status: ACTIVE | Noted: 2023-11-08

## 2023-11-11 PROBLEM — E87.6 HYPOKALEMIA: Status: ACTIVE | Noted: 2023-11-11

## 2023-11-12 PROBLEM — E87.20 METABOLIC ACIDOSIS: Status: RESOLVED | Noted: 2023-11-09 | Resolved: 2023-11-12

## 2023-11-16 ENCOUNTER — TELEPHONE (OUTPATIENT)
Dept: AUDIOLOGY | Facility: CLINIC | Age: 78
End: 2023-11-16
Payer: MEDICARE

## 2023-11-16 NOTE — TELEPHONE ENCOUNTER
I attempted to return Ms. Claudette's phone call about getting the patient scheduled with ENT and Audiology 6 to 8 weeks from now. I left a message on the voicemail asking her to call us back so we can get the patient scheduled.

## 2024-02-03 PROBLEM — F33.1 MODERATE EPISODE OF RECURRENT MAJOR DEPRESSIVE DISORDER: Status: ACTIVE | Noted: 2024-02-03

## 2024-02-03 PROBLEM — G25.71 AKATHISIA: Chronic | Status: ACTIVE | Noted: 2023-11-08

## 2024-06-28 NOTE — PROGRESS NOTES
NEUROLOGY  Outpatient Follow Up    [facility]  [address], [place] [address]  [phone] (office) / [phone] (fax)    Patient Name:  Milady Bright  :  1945  MR #:  [id number]  Acct #:  [id number]    Date of Neurology Visit: 2024  Name of Provider: GIORGIO Mendoza    Other Physicians:  TOMASA Ellis Jr., MD (Primary Care Physician); No ref. provider found (Referring)      Chief Complaint: Tremors      History of Present Illness (HPI):  Patient is a 77 y/o female with a PMXH of HTN, DM, HLD, Memory loss, Scoliosis        Interval Hx 2023:   Patient is new to me. She has been evaluated by Dr. Reyes in the past for memory loss.    Patient is here today for tremors. It occurs to her entire body only when standing. She denies associated symptoms like lightheadedness, LOC, dizziness, visual or speech disturbances.   These events started last Friday and has only occurred one other time. It ocurrs mainly when standing and relieved when sitting down. She is able to walk and move freely during that time.  Daughter here with the patient seems to have noticed tremor episodes on more occasions. She and daughter state she is a very nervous person. She is maintained on Wellbutrin and Fluoxetine but does not believe it offers much aid.   She does endorse problems with her memory. 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 This has stressed her out as she is forgetting work functions and numbers.   She denies recent falls, back/neck pain, or focal weakness.     Interval Hx 2024:  Patient presents today for tremor follow up. She is accompanied by her daughter who helps supply information. Daughter states since last evaluated she did have more tremulous events. She was admitted to the hospital for this and it was believed that is was metabolic induced; side effect to a medication and hyperglycemia. She was discharged to [facility] and her body shaking  improved but then came back. It was suspected she had Tardive Dyskinesia. She was started on Ingrezza in February by the physician at [facility] which has resolved her symptoms. She continues to take Propranolol and denies an action tremor to interfere with ADLs.       Past Medical, Surgical, Family & Social History:   Reviewed and updated.    Home Medications:     Current Outpatient Medications:     blood sugar diagnostic Strp, To check BG 3 times daily, to use with insurance preferred meter Dx E 11.65, Disp: 150 each, Rfl: 1    blood-glucose meter kit, To check BG 3 times daily, to use with insurance preferred meter  Dx E 11.65, Disp: 1 each, Rfl: 1    buPROPion (WELLBUTRIN XL) 150 MG TB24 tablet, Take 150 mg by mouth once daily., Disp: , Rfl:     insulin aspart U-100 (NOVOLOG) 100 unit/mL (3 mL) InPn pen, Inject 3 Units into the skin 3 (three) times daily., Disp: , Rfl: 0    irbesartan (AVAPRO) 300 MG tablet, TAKE 1 TABLET EVERY DAY, Disp: 90 tablet, Rfl: 3    magnesium oxide 500 mg Tab, Take 800 mg by mouth 2 (two) times a day., Disp: , Rfl: 0    melatonin (MELATIN) 3 mg tablet, Take 3 tablets (9 mg total) by mouth nightly., Disp: , Rfl: 0    mirabegron (MYRBETRIQ) 25 mg Tb24 ER tablet, Take 25 mg by mouth once daily., Disp: , Rfl:     potassium chloride (MICRO-K) 10 MEQ CpSR, Take 2 capsules (20 mEq total) by mouth once daily., Disp: 90 capsule, Rfl: 3    propranoloL (INDERAL) 20 MG tablet, Take 1 tablet (20 mg total) by mouth 2 (two) times daily., Disp: 60 tablet, Rfl: 2    acetaminophen (TYLENOL) 500 MG tablet, Take 500 mg by mouth every 6 (six) hours as needed for Pain., Disp: , Rfl:     amLODIPine (NORVASC) 10 MG tablet, TAKE 1/2 TABLET BY MOUTH ONCE DAILY (Patient taking differently: Take 10 mg by mouth once daily.), Disp: 90 tablet, Rfl: 1    DULoxetine (CYMBALTA) 30 MG capsule, Take 1 capsule (30 mg total) by mouth once daily., Disp: 30 capsule, Rfl: 11    HYDROcodone-acetaminophen (NORCO) 5-325 mg  "per tablet, Take 1 tablet by mouth every 6 (six) hours as needed for Pain., Disp: , Rfl:     insulin glargine, TOUJEO, (TOUJEO SOLOSTAR U-300 INSULIN) 300 unit/mL (1.5 mL) InPn pen, Inject 10 Units into the skin once daily., Disp: 1 mL, Rfl: 1    lancets Misc, To check BG 3 times daily, to use with insurance preferred meter  Dx E 11.65, Disp: 150 each, Rfl: 1    scopolamine (TRANSDERM-SCOP) 1.3-1.5 mg (1 mg over 3 days), Place 1 patch onto the skin every 72 hours., Disp: , Rfl:     temazepam (RESTORIL) 7.5 MG Cap, Take 15 mg by mouth nightly as needed., Disp: , Rfl:     valbenazine (INGREZZA) 40 mg Cap, Take 40 capsules by mouth every evening., Disp: , Rfl:     Physical Examination:  BP (!) 151/73 (BP Location: Left arm, Patient Position: Sitting, BP Method: Small (Automatic))   Pulse 60   Resp 18   Ht 5' 6" (1.676 m)   Wt 67.2 kg (148 lb 2.4 oz)   BMI 23.91 kg/m²     GENERAL:  General appearance: Well, non-toxic appearing.  No apparent distress.  Neck: supple.    MENTAL STATUS:  Alertness, attention span & concentration: normal.  Language: normal.  Orientation to self, place & time:  normal.  Memory, recent & remote: normal.  Fund of knowledge: normal.  MMSE: 29/30 (2022)    SPEECH:  Clear and fluent.  Follows complex commands.    CRANIAL NERVES:  Cranial Nerves II-XII were examined.  II - Visual fields: normal.  III, IV, VI: PERRL, EOMI, No ptosis, No nystagmus.  V - Facial sensation: normal.  VII - Face symmetry & mobility: normal.  VIII - Hearing: normal  IX, X - Palate: mobile & midline.  XI - Shoulder shrug: normal.  XII - Tongue protrusion: normal.      GROSS MOTOR:  Gait & station: uses walker to get out of chair; no shuffling  Tone: unable to relax; cogwheel not suspected  Arms extended: no tremor  Arms to core: no tremor  Abnormal movements: none.  Finger-nose: no tremor  Heel-knee-shin: normal.  Rapid alternating movements: normal.  Pronator drift: normal      MUSCLE STRENGTH:   Hand grasp:   - " right:5/5   - left:5/5    RIGHT    LEFT   5 Neck Ext. 5   5 Neck Flex 5   5 Deltoids 5   5 Biceps 5   5 Triceps 5   5 Forearm.Pr. 5        5 Iliopsoas flex    5   5 Hip Abduct 5   5 Hip Adduct 5   5 Quads 5   5 Hams 5   5 Dorsiflex 5   5 Plantar Flex 5     REFLEXES:    RIGHT Reflex   LEFT   2+ Biceps 2+   2+ Brachiorad. 2+        2+ Patellar 2+     SENSORY:  Light touch: Normal throughout.             Diagnostic Data Reviewed:     Folate   Date Value Ref Range Status   10/14/2014 13.5 4.0 - 24.0 ng/mL Final     Vitamin B-12   Date Value Ref Range Status   02/09/2023 986 (H) 210 - 950 pg/mL Final     Comment:     Patients taking very high Biotin doses of >300 mcg/day may   cause a positive bias in this assay.       TSH   Date Value Ref Range Status   11/10/2023 2.020 0.400 - 4.000 uIU/mL Final     Comment:     Warning:  Heterophilic antibodies in serum or plasma of   certain individuals are known to cause interference with   immunoassays. These antibodies may be present in blood samples   from individuals regularly exposed to animal or who have been   treated with animal products.     Patients taking very high Biotin doses of >300 mcg/day may   cause a negative bias in this assay.       RPR   Date Value Ref Range Status   10/14/2014 Non-reactive Non-reactive Final     Thiamine   Date Value Ref Range Status   10/14/2014 71 38 - 122 ug/L Final     Comment:     Test performed at [facility],  [address] [place] [address]   [phone]  Ken Mendez MD  - Med. Director       Ammonia   Date Value Ref Range Status   11/08/2023 <9 (A) 9 - 30 umol/L Final                      Assessment and Plan:    Problem List Items Addressed This Visit          Neuro    Tremor, unspecified (Chronic)    Current Assessment & Plan     Patient is a 78 y/o female that presents for tremor f/u.   Previous reports of feeling tremors throughout her entire body was thought to be metabolically induced (BG, UTI, etc)  Once  metabolic issues were corrected tremor initially did improve but returned. Pt then prescribed Ingrezza for suspicion of TD. Overall this has offered a lot of aid   - OK to continue.   Prev neuro exam reported with mild tremor on FTN testing. No PD features on exam.   Propranolol 20 mg BID started and has done well  for her   - continue   Pt to get established with movement clinic for TD if MD at [facility] wishes not to manage                     Important to note, also  has a past medical history of Diabetes mellitus, type 2, Disorder of bone and cartilage, unspecified, Hyperlipidemia, Hypertension, Memory loss, Other hammer toe (acquired), Polyp of ascending colon (05/19/2020), Polyp of hepatic flexure of colon (05/19/2020), Scoliosis (and kyphoscoliosis), idiopathic, Symptomatic menopausal or female climacteric states, Unspecified hereditary and idiopathic peripheral neuropathy, and Urinary hesitancy.          The patient will return to clinic in 12 mos    All questions were answered and patient is comfortable with the plan.         Thank you very much for the opportunity to assist in this patient's care.    If you have any questions or concerns, please do not hesitate to contact me at any time.      Sincerely,     Emily Landeros, MACHO-C  [facility]

## 2024-07-01 ENCOUNTER — OFFICE VISIT (OUTPATIENT)
Dept: NEUROLOGY | Facility: CLINIC | Age: 79
End: 2024-07-01
Payer: MEDICARE

## 2024-07-01 VITALS
HEART RATE: 60 BPM | DIASTOLIC BLOOD PRESSURE: 73 MMHG | SYSTOLIC BLOOD PRESSURE: 151 MMHG | HEIGHT: 66 IN | RESPIRATION RATE: 18 BRPM | BODY MASS INDEX: 23.81 KG/M2 | WEIGHT: 148.13 LBS

## 2024-07-01 DIAGNOSIS — R25.1 TREMOR, UNSPECIFIED: ICD-10-CM

## 2024-07-01 PROCEDURE — 99999 PR PBB SHADOW E&M-EST. PATIENT-LVL IV: CPT | Mod: PBBFAC,,, | Performed by: NURSE PRACTITIONER

## 2024-07-01 PROCEDURE — 99214 OFFICE O/P EST MOD 30 MIN: CPT | Mod: S$GLB,,, | Performed by: NURSE PRACTITIONER

## 2024-07-01 RX ORDER — SCOLOPAMINE TRANSDERMAL SYSTEM 1 MG/1
1 PATCH, EXTENDED RELEASE TRANSDERMAL
COMMUNITY

## 2024-07-01 RX ORDER — BUPROPION HYDROCHLORIDE 150 MG/1
150 TABLET ORAL DAILY
COMMUNITY

## 2024-07-01 RX ORDER — MIRABEGRON 25 MG/1
25 TABLET, FILM COATED, EXTENDED RELEASE ORAL DAILY
COMMUNITY

## 2024-07-01 RX ORDER — HYDROCODONE BITARTRATE AND ACETAMINOPHEN 5; 325 MG/1; MG/1
1 TABLET ORAL EVERY 6 HOURS PRN
COMMUNITY

## 2024-07-01 RX ORDER — VALBENAZINE 40 MG/1
40 CAPSULE ORAL NIGHTLY
COMMUNITY

## 2024-07-01 RX ORDER — TEMAZEPAM 7.5 MG/1
15 CAPSULE ORAL NIGHTLY PRN
COMMUNITY

## 2024-07-01 RX ORDER — ACETAMINOPHEN 500 MG
500 TABLET ORAL EVERY 6 HOURS PRN
COMMUNITY

## 2024-07-01 NOTE — ASSESSMENT & PLAN NOTE
Patient is a 80 y/o female that presents for tremor f/u.   Previous reports of feeling tremors throughout her entire body was thought to be metabolically induced (BG, UTI, etc)  Once metabolic issues were corrected tremor initially did improve but returned. Pt then prescribed Ingrezza for suspicion of TD. Overall this has offered a lot of aid   - OK to continue.   Prev neuro exam reported with mild tremor on FTN testing. No PD features on exam.   Propranolol 20 mg BID started and has done well  for her   - continue   Pt to get established with movement clinic for TD if MD at [facility] wishes not to manage